# Patient Record
Sex: MALE | Race: WHITE | HISPANIC OR LATINO | Employment: PART TIME | URBAN - METROPOLITAN AREA
[De-identification: names, ages, dates, MRNs, and addresses within clinical notes are randomized per-mention and may not be internally consistent; named-entity substitution may affect disease eponyms.]

---

## 2018-05-01 LAB
ALBUMIN SERPL BCP-MCNC: 4.2 G/DL (ref 3.5–5.7)
ALP SERPL-CCNC: 71 IU/L (ref 60–400)
ALT SERPL W P-5'-P-CCNC: 17 IU/L (ref 0–50)
ANION GAP SERPL CALCULATED.3IONS-SCNC: 9 MM/L
APTT PPP: 28.9 SEC (ref 24.4–37.6)
AST SERPL W P-5'-P-CCNC: 22 U/L (ref 8–27)
BASOPHILS # BLD AUTO: 0 X3/UL (ref 0–0.3)
BASOPHILS # BLD AUTO: 0.5 % (ref 0–2)
BILIRUB SERPL-MCNC: 0.4 MG/DL (ref 0.3–1)
BUN SERPL-MCNC: 13 MG/DL (ref 7–25)
CALCIUM SERPL-MCNC: 9.1 MG/DL (ref 8.6–10.5)
CHLORIDE SERPL-SCNC: 106 MM/L (ref 98–107)
CO2 SERPL-SCNC: 26 MM/L (ref 21–31)
CREAT SERPL-MCNC: 1.01 MG/DL (ref 0.7–1.3)
DEPRECATED RDW RBC AUTO: 14 % (ref 11.5–14.5)
EGFR (HISTORICAL): > 60 GFR
EGFR AFRICAN AMERICAN (HISTORICAL): > 60 GFR
EOSINOPHIL # BLD AUTO: 0.1 X3/UL (ref 0–0.5)
EOSINOPHIL NFR BLD AUTO: 1.4 % (ref 0–5)
GLUCOSE (HISTORICAL): 88 MG/DL (ref 65–99)
HCT VFR BLD AUTO: 39.2 % (ref 42–52)
HGB BLD-MCNC: 13.2 G/DL (ref 14–18)
INR PPP: 1.02 (ref 0.9–1.5)
LDL CHOLESTEROL DIRECT (HISTORICAL): 69.6 MG/DL
LDL CHOLESTEROL DIRECT (HISTORICAL): 69.6 MG/DL
LYMPHOCYTES # BLD AUTO: 2.3 X3/UL (ref 1.2–4.2)
LYMPHOCYTES NFR BLD AUTO: 34.9 % (ref 20.5–51.1)
MCH RBC QN AUTO: 29 PG (ref 26–34)
MCHC RBC AUTO-ENTMCNC: 33.7 G/DL (ref 31–36)
MCV RBC AUTO: 86 FL (ref 81–99)
MONOCYTES # BLD AUTO: 0.8 X3/UL (ref 0–1)
MONOCYTES NFR BLD AUTO: 12.2 % (ref 1.7–12)
NEUTROPHILS # BLD AUTO: 3.4 X3/UL (ref 1.4–6.5)
NEUTS SEG NFR BLD AUTO: 51 % (ref 42.2–75.2)
OSMOLALITY, SERUM (HISTORICAL): 273 MOSM (ref 262–291)
PLATELET # BLD AUTO: 228 X3/UL (ref 130–400)
PMV BLD AUTO: 8.8 FL (ref 8.6–11.7)
POTASSIUM SERPL-SCNC: 4 MM/L (ref 3.5–5.5)
PROTHROMBIN TIME (HISTORICAL): 11.8 SEC (ref 10.1–12.9)
RBC # BLD AUTO: 4.55 X6/UL (ref 4.3–5.9)
SODIUM SERPL-SCNC: 137 MM/L (ref 134–143)
TOTAL PROTEIN (HISTORICAL): 6.5 G/DL (ref 6.4–8.9)
TROPONIN I SERPL-MCNC: < 0.03 NG/ML
WBC # BLD AUTO: 6.7 X3/UL (ref 4.8–10.8)

## 2018-05-22 LAB
ALBUMIN SERPL BCP-MCNC: 4.7 G/DL (ref 3.5–5.7)
ALP SERPL-CCNC: 64 IU/L (ref 60–400)
ALT SERPL W P-5'-P-CCNC: 9 IU/L (ref 0–50)
AMYLASE (HISTORICAL): 26 U/L (ref 29–103)
ANION GAP SERPL CALCULATED.3IONS-SCNC: 13.1 MM/L
APTT PPP: 31 SEC (ref 24.4–37.6)
AST SERPL W P-5'-P-CCNC: 16 U/L (ref 8–27)
BASOPHILS # BLD AUTO: 0 X3/UL (ref 0–0.3)
BASOPHILS # BLD AUTO: 0.7 % (ref 0–2)
BILIRUB SERPL-MCNC: 0.6 MG/DL (ref 0.3–1)
BILIRUB UR QL STRIP: NEGATIVE
BUN SERPL-MCNC: 8 MG/DL (ref 7–25)
CALCIUM SERPL-MCNC: 9.6 MG/DL (ref 8.6–10.5)
CHLORIDE SERPL-SCNC: 103 MM/L (ref 98–107)
CLARITY UR: CLEAR
CO2 SERPL-SCNC: 29 MM/L (ref 21–31)
COLOR UR: YELLOW
CREAT SERPL-MCNC: 1.09 MG/DL (ref 0.7–1.3)
DEPRECATED RDW RBC AUTO: 13.4 % (ref 11.5–14.5)
EGFR (HISTORICAL): > 60 GFR
EGFR AFRICAN AMERICAN (HISTORICAL): > 60 GFR
EOSINOPHIL # BLD AUTO: 0.1 X3/UL (ref 0–0.5)
EOSINOPHIL NFR BLD AUTO: 1.8 % (ref 0–5)
GLUCOSE (HISTORICAL): 80 MG/DL (ref 65–99)
GLUCOSE UR STRIP-MCNC: NEGATIVE MG/DL
HCT VFR BLD AUTO: 43.9 % (ref 42–52)
HGB BLD-MCNC: 14.5 G/DL (ref 14–18)
HGB UR QL STRIP.AUTO: NEGATIVE
INR PPP: 1.1 (ref 0.9–1.5)
KETONES UR STRIP-MCNC: NEGATIVE MG/DL
LEUKOCYTE ESTERASE UR QL STRIP: NEGATIVE
LIPASE SERPL-CCNC: 37 U/L (ref 11–82)
LYMPHOCYTES # BLD AUTO: 1.5 X3/UL (ref 1.2–4.2)
LYMPHOCYTES NFR BLD AUTO: 35.5 % (ref 20.5–51.1)
MCH RBC QN AUTO: 28.6 PG (ref 26–34)
MCHC RBC AUTO-ENTMCNC: 33 G/DL (ref 31–36)
MCV RBC AUTO: 86.6 FL (ref 81–99)
MONOCYTES # BLD AUTO: 0.4 X3/UL (ref 0–1)
MONOCYTES NFR BLD AUTO: 8.8 % (ref 1.7–12)
NEUTROPHILS # BLD AUTO: 2.3 X3/UL (ref 1.4–6.5)
NEUTS SEG NFR BLD AUTO: 53.2 % (ref 42.2–75.2)
NITRITE UR QL STRIP: NEGATIVE
OSMOLALITY, SERUM (HISTORICAL): 279 MOSM (ref 262–291)
PH UR STRIP.AUTO: 7 [PH] (ref 4.5–8)
PLATELET # BLD AUTO: 214 X3/UL (ref 130–400)
PMV BLD AUTO: 8.5 FL (ref 8.6–11.7)
POTASSIUM SERPL-SCNC: 4.1 MM/L (ref 3.5–5.5)
PROT UR STRIP-MCNC: NEGATIVE MG/DL
PROTHROMBIN TIME (HISTORICAL): 12.8 SEC (ref 10.1–12.9)
RBC # BLD AUTO: 5.07 X6/UL (ref 4.3–5.9)
SODIUM SERPL-SCNC: 141 MM/L (ref 134–143)
SP GR UR STRIP.AUTO: 1.02 (ref 1–1.03)
TOTAL PROTEIN (HISTORICAL): 6.9 G/DL (ref 6.4–8.9)
UROBILINOGEN UR QL STRIP.AUTO: 4 EU/DL (ref 0.2–8)
WBC # BLD AUTO: 4.3 X3/UL (ref 4.8–10.8)

## 2018-09-22 ENCOUNTER — OFFICE VISIT (OUTPATIENT)
Dept: URGENT CARE | Facility: CLINIC | Age: 23
End: 2018-09-22
Payer: COMMERCIAL

## 2018-09-22 VITALS
HEART RATE: 64 BPM | SYSTOLIC BLOOD PRESSURE: 122 MMHG | DIASTOLIC BLOOD PRESSURE: 68 MMHG | OXYGEN SATURATION: 98 % | TEMPERATURE: 98.1 F | RESPIRATION RATE: 18 BRPM

## 2018-09-22 DIAGNOSIS — K52.9 AGE (ACUTE GASTROENTERITIS): Primary | ICD-10-CM

## 2018-09-22 PROCEDURE — 99283 EMERGENCY DEPT VISIT LOW MDM: CPT | Performed by: PHYSICIAN ASSISTANT

## 2018-09-22 PROCEDURE — G0382 LEV 3 HOSP TYPE B ED VISIT: HCPCS | Performed by: PHYSICIAN ASSISTANT

## 2018-09-22 RX ORDER — DEXTROAMPHETAMINE SACCHARATE, AMPHETAMINE ASPARTATE, DEXTROAMPHETAMINE SULFATE AND AMPHETAMINE SULFATE 2.5; 2.5; 2.5; 2.5 MG/1; MG/1; MG/1; MG/1
10 TABLET ORAL
COMMUNITY

## 2018-09-22 NOTE — PROGRESS NOTES
[employer] [email or web address] [facility]    NAME: Vidhya Parrish is a 23 y o  male  : 1995    MRN: [id number]  DATE: 2018  TIME: 10:31 AM    Assessment and Plan   AGE (acute gastroenteritis) [K52 9]  1  AGE (acute gastroenteritis)         Patient Instructions     Patient Instructions   Stay hydrated by taking small sips of water through out the day  Avoid large amounts of water at one time  Advance diet as tolerated starting with crackers, toast   Can use imodium for diarrhea  If you are unable to hold down fluids and feel dehydrated, go to the emergency room  Can take tylenol or ibuprofen as needed for headache, pain, fever  Probiotics which can be found over the counter in pill form or in yogurt, such as activia, would be beneficial to increase normal gut jasper and help with diarrhea  If symptoms worsen go to the emergency room  Chief Complaint     Chief Complaint   Patient presents with    Vomiting     Pt c/o vomiting and diarrhea for three days  History of Present Illness   23year old male here with complaint of vomiting, nausea and diarrhea for the last 2-3 days  Has been able to hold down fluids  Needs a note for work  No fever chills  No abdominal pain  Review of Systems   Review of Systems   Constitutional: Negative for activity change, appetite change, chills, diaphoresis, fatigue, fever and unexpected weight change  HENT: Negative for congestion, ear pain, hearing loss, sinus pressure, sneezing, sore throat and tinnitus  Eyes: Negative for photophobia, redness and visual disturbance  Respiratory: Negative for apnea, cough, chest tightness, shortness of breath, wheezing and stridor  Cardiovascular: Negative for chest pain, palpitations and leg swelling  Gastrointestinal: Positive for diarrhea, nausea and vomiting  Negative for abdominal distention, abdominal pain, blood in stool and constipation     Endocrine: Negative for cold intolerance, heat intolerance, polydipsia, polyphagia and polyuria  Genitourinary: Negative for difficulty urinating, dysuria, flank pain, hematuria and urgency  Musculoskeletal: Negative for arthralgias, back pain, gait problem, myalgias, neck pain and neck stiffness  Skin: Negative for rash and wound  Neurological: Negative for dizziness, tremors, seizures, syncope, weakness, light-headedness, numbness and headaches  Hematological: Negative for adenopathy  Does not bruise/bleed easily  Psychiatric/Behavioral: Negative for agitation, behavioral problems, confusion and decreased concentration  The patient is not nervous/anxious  All other systems reviewed and are negative  Current Medications     Current Outpatient Prescriptions:     amphetamine-dextroamphetamine (ADDERALL) 10 mg tablet, Take 10 mg by mouth 2 (two) times a day, Disp: , Rfl:     Current Allergies     Allergies as of 09/22/2018    (No Known Allergies)          The following portions of the patient's history were reviewed and updated as appropriate: allergies, current medications, past family history, past medical history, past social history, past surgical history and problem list    No past medical history on file  No past surgical history on file  No family history on file  Social History     Social History    Marital status: Single     Spouse name: N/A    Number of children: N/A    Years of education: N/A     Occupational History    Not on file  Social History Main Topics    Smoking status: Not on file    Smokeless tobacco: Not on file    Alcohol use Not on file    Drug use: Unknown    Sexual activity: Not on file     Other Topics Concern    Not on file     Social History Narrative    No narrative on file     Medications have been verified  Objective   /68   Pulse 64   Temp 98 1 °F (36 7 °C)   Resp 18   SpO2 98%      Physical Exam   Physical Exam   Constitutional: He appears well-developed and well-nourished   No distress  HENT:   Head: Normocephalic  Right Ear: External ear normal    Left Ear: External ear normal    Nose: Nose normal    Mouth/Throat: Oropharynx is clear and moist  No oropharyngeal exudate  Neck: Normal range of motion  Neck supple  Cardiovascular: Normal rate, regular rhythm and normal heart sounds  No murmur heard  Pulmonary/Chest: Effort normal and breath sounds normal  No respiratory distress  He has no wheezes  He has no rales  Abdominal: Soft  Bowel sounds are normal  There is no tenderness  Musculoskeletal: Normal range of motion  Lymphadenopathy:     He has no cervical adenopathy  Skin: Skin is warm  No rash noted  Vitals reviewed

## 2018-09-22 NOTE — PATIENT INSTRUCTIONS
Stay hydrated by taking small sips of water through out the day  Avoid large amounts of water at one time  Advance diet as tolerated starting with crackers, toast   Can use imodium for diarrhea  If you are unable to hold down fluids and feel dehydrated, go to the emergency room  Can take tylenol or ibuprofen as needed for headache, pain, fever  Probiotics which can be found over the counter in pill form or in yogurt, such as activia, would be beneficial to increase normal gut jasper and help with diarrhea  If symptoms worsen go to the emergency room

## 2019-02-01 ENCOUNTER — OFFICE VISIT (OUTPATIENT)
Dept: URGENT CARE | Facility: CLINIC | Age: 24
End: 2019-02-01
Payer: COMMERCIAL

## 2019-02-01 VITALS
TEMPERATURE: 97.4 F | DIASTOLIC BLOOD PRESSURE: 59 MMHG | HEART RATE: 65 BPM | RESPIRATION RATE: 18 BRPM | OXYGEN SATURATION: 98 % | SYSTOLIC BLOOD PRESSURE: 118 MMHG

## 2019-02-01 DIAGNOSIS — J06.9 ACUTE URI: Primary | ICD-10-CM

## 2019-02-01 PROCEDURE — 99283 EMERGENCY DEPT VISIT LOW MDM: CPT | Performed by: PHYSICIAN ASSISTANT

## 2019-02-01 PROCEDURE — G0382 LEV 3 HOSP TYPE B ED VISIT: HCPCS | Performed by: PHYSICIAN ASSISTANT

## 2019-02-01 PROCEDURE — 99213 OFFICE O/P EST LOW 20 MIN: CPT | Performed by: PHYSICIAN ASSISTANT

## 2019-02-01 RX ORDER — BENZONATATE 100 MG/1
100 CAPSULE ORAL 3 TIMES DAILY PRN
Qty: 30 CAPSULE | Refills: 0 | Status: SHIPPED | OUTPATIENT
Start: 2019-02-01

## 2019-02-01 NOTE — PATIENT INSTRUCTIONS

## 2019-02-01 NOTE — PROGRESS NOTES
[employer] [facility]        NAME: Sivakumar Green is a 26 y o  male  : 1995    MRN: [id number]  DATE: 2019  TIME: 10:19 AM    Assessment and Plan   Acute URI [J06 9]  1  Acute URI  benzonatate (TESSALON PERLES) 100 mg capsule         Patient Instructions       Discussed with patient that symptoms are likely viral  Will rx tessalon perles for symptomatic relief of cough  Also recommend supportive measures at this time:   1  Push fluids and rest   2  OTC Tylenol/Motrin as needed for pain/fever   3  Flonase daily/Sudafed PRN for symptomatic  relief   4  If symptoms worsen or last longer than 1  week, return to clinic or call PCP        Patient Instructions     Upper Respiratory Infection   WHAT YOU NEED TO KNOW:   An upper respiratory infection is also called the common cold  It is an infection that can affect your nose, throat, ears, and sinuses  For healthy people, the common cold is usually not serious and does not need special treatment  Cold symptoms are usually worst for the first 3 to 5 days  Most people get better in 7 to 14 days  You may continue to cough for 2 to 3 weeks  Colds are caused by viruses and do not get better with antibiotics  DISCHARGE INSTRUCTIONS:   Seek care immediately if:   · You have chest pain or trouble breathing  Contact your healthcare provider if:   · You have a fever over 102ºF (39°C)  · Your sore throat gets worse or you see white or yellow spots in your throat  · Your symptoms get worse after 3 to 5 days or your cold is not better in 14 days  · You have a rash anywhere on your skin  · You have large, tender lumps in your neck  · You have thick, green, or yellow drainage from your nose  · You cough up thick yellow, green, or bloody mucus  · You are vomiting for more than 24 hours and cannot keep fluids down  · You have a bad earache  · You have questions or concerns about your condition or care  Medicines:   You may need any of the following:  · Decongestants  help reduce nasal congestion and help you breathe more easily  If you take decongestant pills, they may make you feel restless or cause problems with your sleep  Do not use decongestant sprays for more than a few days  · Cough suppressants  help reduce coughing  Ask your healthcare provider which type of cough medicine is best for you  · NSAIDs , such as ibuprofen, help decrease swelling, pain, and fever  NSAIDs can cause stomach bleeding or kidney problems in certain people  If you take blood thinner medicine, always ask your healthcare provider if NSAIDs are safe for you  Always read the medicine label and follow directions  · Acetaminophen  decreases pain and fever  It is available without a doctor's order  Ask how much to take and how often to take it  Follow directions  Read the labels of all other medicines you are using to see if they also contain acetaminophen, or ask your doctor or pharmacist  Acetaminophen can cause liver damage if not taken correctly  Do not use more than 4 grams (4,000 milligrams) total of acetaminophen in one day  · Take your medicine as directed  Contact your healthcare provider if you think your medicine is not helping or if you have side effects  Tell him or her if you are allergic to any medicine  Keep a list of the medicines, vitamins, and herbs you take  Include the amounts, and when and why you take them  Bring the list or the pill bottles to follow-up visits  Carry your medicine list with you in case of an emergency  Follow up with your healthcare provider as directed:  Write down your questions so you remember to ask them during your visits  Self-care:   · Rest as much as possible  Slowly start to do more each day  · Drink more liquids as directed  Liquids will help thin and loosen mucus so you can cough it up  Liquids will also help prevent dehydration   Liquids that help prevent dehydration include water, fruit juice, and broth  Do not drink liquids that contain caffeine  Caffeine can increase your risk for dehydration  Ask your healthcare provider how much liquid to drink each day  · Soothe a sore throat  Gargle with warm salt water  This helps your sore throat feel better  Make salt water by dissolving ¼ teaspoon salt in 1 cup warm water  You may also suck on hard candy or throat lozenges  You may use a sore throat spray  · Use a humidifier or vaporizer  Use a cool mist humidifier or a vaporizer to increase air moisture in your home  This may make it easier for you to breathe and help decrease your cough  · Use saline nasal drops as directed  These help relieve congestion  · Apply petroleum-based jelly around the outside of your nostrils  This can decrease irritation from blowing your nose  · Do not smoke  Nicotine and other chemicals in cigarettes and cigars can make your symptoms worse  They can also cause infections such as bronchitis or pneumonia  Ask your healthcare provider for information if you currently smoke and need help to quit  E-cigarettes or smokeless tobacco still contain nicotine  Talk to your healthcare provider before you use these products  Prevent spreading your cold to others:   · Try to stay away from other people during the first 2 to 3 days of your cold when it is more easily spread  · Do not share food or drinks  · Do not share hand towels with household members  · Wash your hands often, especially after you blow your nose  Turn away from other people and cover your mouth and nose with a tissue when you sneeze or cough  © 2017 [employer] Information is for End User's use only and may not be sold, redistributed or otherwise used for commercial purposes  All illustrations and images included in CareNotes® are the copyrighted property of [employer]  or [employer]eb [employer]  The above information is an  only   It is not intended as medical advice for individual conditions or treatments  Talk to your doctor, nurse or pharmacist before following any medical regimen to see if it is safe and effective for you  Proceed to  ER if symptoms worsen  Chief Complaint     Chief Complaint   Patient presents with    Vomiting     Pt c/o vomiting for four days and also a cough  History of Present Illness       Patient presents for eval of cough onset 3 days ago with associated post tussive emesis (x2 episodes daily), sinus congestion, runny nose  Pt states he has been taking aleve for pain but no other OTC medicines  Pt denies nausea, diarrhea, abd pain, fever/chills, cp, SOB  Review of Systems   Review of Systems      Current Medications       Current Outpatient Prescriptions:     amphetamine-dextroamphetamine (ADDERALL) 10 mg tablet, Take 10 mg by mouth 2 (two) times a day, Disp: , Rfl:     benzonatate (TESSALON PERLES) 100 mg capsule, Take 1 capsule (100 mg total) by mouth 3 (three) times a day as needed for cough, Disp: 30 capsule, Rfl: 0    Current Allergies     Allergies as of 02/01/2019    (No Known Allergies)            The following portions of the patient's history were reviewed and updated as appropriate: allergies, current medications, past family history, past medical history, past social history, past surgical history and problem list      No past medical history on file  No past surgical history on file  No family history on file  Medications have been verified  Objective   /59   Pulse 65   Temp (!) 97 4 °F (36 3 °C)   Resp 18   SpO2 98%        Physical Exam     Physical Exam   Constitutional: He is oriented to person, place, and time  He appears well-developed and well-nourished  No distress  HENT:   Head: Normocephalic and atraumatic  Right Ear: External ear and ear canal normal  Tympanic membrane is erythematous     Left Ear: Tympanic membrane, external ear and ear canal normal  Nose: Rhinorrhea present  Mouth/Throat: Uvula is midline, oropharynx is clear and moist and mucous membranes are normal    Eyes: Pupils are equal, round, and reactive to light  Conjunctivae and EOM are normal    Cardiovascular: Normal rate, regular rhythm and normal heart sounds  Exam reveals no gallop  No murmur heard  Pulmonary/Chest: No accessory muscle usage  No respiratory distress  He has no wheezes  He has no rhonchi  He has no rales  Abdominal: Soft  Normal appearance and bowel sounds are normal  There is no tenderness  There is no rebound and no guarding  Lymphadenopathy:     He has no cervical adenopathy  Neurological: He is alert and oriented to person, place, and time  No cranial nerve deficit  Skin: Skin is warm, dry and intact  No rash noted  Psychiatric: He has a normal mood and affect  Nursing note and vitals reviewed

## 2019-07-04 ENCOUNTER — HOSPITAL ENCOUNTER (EMERGENCY)
Facility: HOSPITAL | Age: 24
Discharge: HOME | End: 2019-07-04
Attending: FAMILY MEDICINE
Payer: OTHER MISCELLANEOUS

## 2019-07-04 VITALS
WEIGHT: 172 LBS | SYSTOLIC BLOOD PRESSURE: 126 MMHG | OXYGEN SATURATION: 97 % | HEART RATE: 67 BPM | BODY MASS INDEX: 22.07 KG/M2 | TEMPERATURE: 98.3 F | HEIGHT: 74 IN | RESPIRATION RATE: 16 BRPM | DIASTOLIC BLOOD PRESSURE: 73 MMHG

## 2019-07-04 DIAGNOSIS — S60.415A ABRASION OF LEFT RING FINGER, INITIAL ENCOUNTER: Primary | ICD-10-CM

## 2019-07-04 PROCEDURE — 90715 TDAP VACCINE 7 YRS/> IM: CPT | Performed by: FAMILY MEDICINE

## 2019-07-04 PROCEDURE — 90471 IMMUNIZATION ADMIN: CPT

## 2019-07-04 PROCEDURE — 99282 EMERGENCY DEPT VISIT SF MDM: CPT

## 2019-07-04 RX ORDER — GINSENG 100 MG
1 CAPSULE ORAL ONCE
Status: COMPLETED | OUTPATIENT
Start: 2019-07-04 | End: 2019-07-04

## 2019-07-04 RX ORDER — IBUPROFEN 400 MG/1
400 TABLET ORAL ONCE
Status: COMPLETED | OUTPATIENT
Start: 2019-07-04 | End: 2019-07-04

## 2019-07-04 RX ADMIN — IBUPROFEN 400 MG: 400 TABLET ORAL at 21:56

## 2019-07-04 RX ADMIN — BACITRACIN 1 SMALL APPLICATION: 500 OINTMENT TOPICAL at 21:52

## 2019-07-04 RX ADMIN — TETANUS TOXOID, REDUCED DIPHTHERIA TOXOID AND ACELLULAR PERTUSSIS VACCINE, ADSORBED 0.5 ML: 5; 2.5; 8; 8; 2.5 SUSPENSION INTRAMUSCULAR at 22:00

## 2019-07-05 NOTE — ED PROVIDER NOTES
History  Chief Complaint   Patient presents with    Nail Bed Injury     pt  was at work using a  when the blade made contact with left ring finger  minimal bleeding on arrival to ED  History provided by:  Patient   used: No      This is a 53-year-old male presented to ED with complain of laceration of the left ring finger when he was using a  at work  Pt states that this happened about 2 hours ago and told his boss who recommended the ED  The patient stated did not use anything on his nail  Patient is not up-to-date with his tetanus  Prior to Admission Medications   Prescriptions Last Dose Informant Patient Reported? Taking? amphetamine-dextroamphetamine (ADDERALL) 10 mg tablet Not Taking at Unknown time  Yes No   Sig: Take 10 mg by mouth 2 (two) times a day   benzonatate (TESSALON PERLES) 100 mg capsule Not Taking at Unknown time  No No   Sig: Take 1 capsule (100 mg total) by mouth 3 (three) times a day as needed for cough   Patient not taking: Reported on 7/4/2019      Facility-Administered Medications: None       History reviewed  No pertinent past medical history  History reviewed  No pertinent surgical history  History reviewed  No pertinent family history  I have reviewed and agree with the history as documented  Social History     Tobacco Use    Smoking status: Never Smoker    Smokeless tobacco: Never Used   Substance Use Topics    Alcohol use: Never     Frequency: Never    Drug use: Never        Review of Systems   Constitutional: Negative  Respiratory: Negative  Cardiovascular: Negative  Skin: Positive for wound  Physical Exam  Physical Exam   Constitutional: He is oriented to person, place, and time  He appears well-developed and well-nourished  HENT:   Head: Normocephalic and atraumatic  Cardiovascular: Normal rate, regular rhythm and normal heart sounds     Pulmonary/Chest: Effort normal and breath sounds normal  Neurological: He is alert and oriented to person, place, and time  Skin:   Left ring finger: There is superficial abrasion above the nail bed of the left ring finger  No laceration or nail bed injury noted  Nursing note and vitals reviewed  Vital Signs  ED Triage Vitals [07/04/19 2135]   Temperature Pulse Respirations Blood Pressure SpO2   98 3 °F (36 8 °C) 67 16 126/73 97 %      Temp Source Heart Rate Source Patient Position - Orthostatic VS BP Location FiO2 (%)   Temporal -- -- -- --      Pain Score       8           Vitals:    07/04/19 2135   BP: 126/73   Pulse: 67         Visual Acuity      ED Medications  Medications   tetanus-diphtheria-acellular pertussis (BOOSTRIX) IM injection 0 5 mL (0 5 mL Intramuscular Given 7/4/19 2200)   bacitracin topical ointment 1 small application (1 small application Topical Given 7/4/19 2152)   ibuprofen (MOTRIN) tablet 400 mg (400 mg Oral Given 7/4/19 2156)       Diagnostic Studies  Results Reviewed     None                 No orders to display              Procedures  Procedures       ED Course                               MDM    Disposition  Final diagnoses:   Abrasion of left ring finger, initial encounter     Time reflects when diagnosis was documented in both MDM as applicable and the Disposition within this note     Time User Action Codes Description Comment    7/4/2019  9:49 PM Ngoc, [address] Abrasion of left ring finger, initial encounter       ED Disposition     ED Disposition Condition Date/Time Comment    Discharge Stable u Jul 4, 2019 10:00 PM Ruben Banuelos discharge to home/self care              Follow-up Information     Follow up With Specialties Details Why Contact Shemar Pina DO Family Medicine Schedule an appointment as soon as possible for a visit in 2 days For wound re-check [address] [place] [address]  [phone]            Patient's Medications   Discharge Prescriptions    No medications on file     No discharge procedures on file      ED Provider  Electronically Signed by           Jose Sprague MD  07/04/19 9459

## 2019-10-12 ENCOUNTER — HOSPITAL ENCOUNTER (EMERGENCY)
Facility: HOSPITAL | Age: 24
Discharge: HOME | End: 2019-10-12
Attending: EMERGENCY MEDICINE | Admitting: EMERGENCY MEDICINE

## 2019-10-12 VITALS
SYSTOLIC BLOOD PRESSURE: 104 MMHG | OXYGEN SATURATION: 99 % | TEMPERATURE: 98 F | WEIGHT: 182 LBS | HEART RATE: 68 BPM | HEIGHT: 74 IN | BODY MASS INDEX: 23.36 KG/M2 | DIASTOLIC BLOOD PRESSURE: 62 MMHG | RESPIRATION RATE: 14 BRPM

## 2019-10-12 DIAGNOSIS — R11.2 NAUSEA AND VOMITING: ICD-10-CM

## 2019-10-12 DIAGNOSIS — F10.929 ALCOHOL INTOXICATION (HCC): Primary | ICD-10-CM

## 2019-10-12 LAB
ALBUMIN SERPL BCP-MCNC: 4.9 G/DL (ref 3.5–5.7)
ALP SERPL-CCNC: 58 U/L (ref 40–150)
ALT SERPL W P-5'-P-CCNC: 11 U/L (ref 7–52)
ANION GAP SERPL CALCULATED.3IONS-SCNC: 12 MMOL/L (ref 4–13)
AST SERPL W P-5'-P-CCNC: 21 U/L (ref 13–39)
ATRIAL RATE: 80 BPM
BASOPHILS # BLD AUTO: 0 THOUSANDS/ΜL (ref 0–0.1)
BASOPHILS NFR BLD AUTO: 1 % (ref 0–2)
BILIRUB SERPL-MCNC: 0.4 MG/DL (ref 0.2–1)
BUN SERPL-MCNC: 8 MG/DL (ref 7–25)
CALCIUM SERPL-MCNC: 9.5 MG/DL (ref 8.6–10.5)
CHLORIDE SERPL-SCNC: 107 MMOL/L (ref 98–107)
CO2 SERPL-SCNC: 23 MMOL/L (ref 21–31)
CREAT SERPL-MCNC: 1.02 MG/DL (ref 0.7–1.3)
EOSINOPHIL # BLD AUTO: 0 THOUSAND/ΜL (ref 0–0.61)
EOSINOPHIL NFR BLD AUTO: 0 % (ref 0–5)
ERYTHROCYTE [DISTWIDTH] IN BLOOD BY AUTOMATED COUNT: 13.2 % (ref 11.5–14.5)
ETHANOL SERPL-MCNC: 202.7 MG/DL
GFR SERPL CREATININE-BSD FRML MDRD: 105 ML/MIN/1.73SQ M
GLUCOSE SERPL-MCNC: 110 MG/DL (ref 65–99)
HCT VFR BLD AUTO: 45.2 % (ref 42–47)
HGB BLD-MCNC: 15.1 G/DL (ref 14–18)
LIPASE SERPL-CCNC: 21 U/L (ref 11–82)
LYMPHOCYTES # BLD AUTO: 1.4 THOUSANDS/ΜL (ref 0.6–4.47)
LYMPHOCYTES NFR BLD AUTO: 21 % (ref 21–51)
MCH RBC QN AUTO: 28.9 PG (ref 26–34)
MCHC RBC AUTO-ENTMCNC: 33.5 G/DL (ref 31–37)
MCV RBC AUTO: 86 FL (ref 81–99)
MONOCYTES # BLD AUTO: 0.4 THOUSAND/ΜL (ref 0.17–1.22)
MONOCYTES NFR BLD AUTO: 7 % (ref 2–12)
NEUTROPHILS # BLD AUTO: 4.7 THOUSANDS/ΜL (ref 1.4–6.5)
NEUTS SEG NFR BLD AUTO: 72 % (ref 42–75)
P AXIS: 76 DEGREES
PLATELET # BLD AUTO: 217 THOUSANDS/UL (ref 149–390)
PMV BLD AUTO: 8.6 FL (ref 8.6–11.7)
POTASSIUM SERPL-SCNC: 3.5 MMOL/L (ref 3.5–5.5)
PR INTERVAL: 168 MS
PROT SERPL-MCNC: 7.7 G/DL (ref 6.4–8.9)
QRS AXIS: 93 DEGREES
QRSD INTERVAL: 106 MS
QT INTERVAL: 388 MS
QTC INTERVAL: 447 MS
RBC # BLD AUTO: 5.24 MILLION/UL (ref 4.3–5.9)
SODIUM SERPL-SCNC: 142 MMOL/L (ref 134–143)
T WAVE AXIS: 67 DEGREES
VENTRICULAR RATE: 80 BPM
WBC # BLD AUTO: 6.5 THOUSAND/UL (ref 4.8–10.8)

## 2019-10-12 PROCEDURE — 83690 ASSAY OF LIPASE: CPT | Performed by: EMERGENCY MEDICINE

## 2019-10-12 PROCEDURE — 85025 COMPLETE CBC W/AUTO DIFF WBC: CPT | Performed by: EMERGENCY MEDICINE

## 2019-10-12 PROCEDURE — 96361 HYDRATE IV INFUSION ADD-ON: CPT

## 2019-10-12 PROCEDURE — 93010 ELECTROCARDIOGRAM REPORT: CPT | Performed by: INTERNAL MEDICINE

## 2019-10-12 PROCEDURE — 99284 EMERGENCY DEPT VISIT MOD MDM: CPT

## 2019-10-12 PROCEDURE — 80053 COMPREHEN METABOLIC PANEL: CPT | Performed by: EMERGENCY MEDICINE

## 2019-10-12 PROCEDURE — 93005 ELECTROCARDIOGRAM TRACING: CPT

## 2019-10-12 PROCEDURE — 80320 DRUG SCREEN QUANTALCOHOLS: CPT | Performed by: EMERGENCY MEDICINE

## 2019-10-12 PROCEDURE — 36415 COLL VENOUS BLD VENIPUNCTURE: CPT | Performed by: EMERGENCY MEDICINE

## 2019-10-12 PROCEDURE — 96374 THER/PROPH/DIAG INJ IV PUSH: CPT

## 2019-10-12 PROCEDURE — 96375 TX/PRO/DX INJ NEW DRUG ADDON: CPT

## 2019-10-12 RX ORDER — ONDANSETRON 2 MG/ML
4 INJECTION INTRAMUSCULAR; INTRAVENOUS ONCE
Status: COMPLETED | OUTPATIENT
Start: 2019-10-12 | End: 2019-10-12

## 2019-10-12 RX ORDER — METOCLOPRAMIDE HYDROCHLORIDE 5 MG/ML
10 INJECTION INTRAMUSCULAR; INTRAVENOUS ONCE
Status: COMPLETED | OUTPATIENT
Start: 2019-10-12 | End: 2019-10-12

## 2019-10-12 RX ADMIN — SODIUM CHLORIDE 1000 ML: 0.9 INJECTION, SOLUTION INTRAVENOUS at 04:35

## 2019-10-12 RX ADMIN — SODIUM CHLORIDE 1000 ML: 0.9 INJECTION, SOLUTION INTRAVENOUS at 04:12

## 2019-10-12 RX ADMIN — ONDANSETRON 4 MG: 2 INJECTION INTRAMUSCULAR; INTRAVENOUS at 04:11

## 2019-10-12 RX ADMIN — METOCLOPRAMIDE 10 MG: 5 INJECTION, SOLUTION INTRAMUSCULAR; INTRAVENOUS at 04:23

## 2019-10-12 NOTE — ED PROVIDER NOTES
History  Chief Complaint   Patient presents with    Alcohol Intoxication     friend reports he has "alcohol poisoning"   vomiting, speech slurred  Patient is a 26-year-old male who presents the emergency department complaining of nausea and vomiting and concern for possible alcohol poisoning he reports that he was drinking corona tonight but also is concerned that he recently received a flu shot about 4 days ago may be having an adverse reaction to that  Patient denies any other:  Ingestion sore medications or drugs abused or used tonight  Patient denies any fall or traumatic injury  History provided by:  Patient and friend  Alcohol Intoxication   Severity:  Moderate  Onset quality:  Sudden  Duration:  3 hours  Timing:  Constant  Progression:  Worsening  Chronicity:  New  Suspected agents:  Alcohol  Associated symptoms: confusion, nausea and vomiting    Associated symptoms: no abdominal pain, no headaches, no palpitations, no shortness of breath and no weakness        Prior to Admission Medications   Prescriptions Last Dose Informant Patient Reported? Taking? amphetamine-dextroamphetamine (ADDERALL) 10 mg tablet Not Taking at Unknown time  Yes No   Sig: Take 10 mg by mouth 2 (two) times a day   benzonatate (TESSALON PERLES) 100 mg capsule Not Taking at Unknown time  No No   Sig: Take 1 capsule (100 mg total) by mouth 3 (three) times a day as needed for cough   Patient not taking: Reported on 7/4/2019      Facility-Administered Medications: None       History reviewed  No pertinent past medical history  History reviewed  No pertinent surgical history  History reviewed  No pertinent family history  I have reviewed and agree with the history as documented      Social History     Tobacco Use    Smoking status: Never Smoker    Smokeless tobacco: Never Used   Substance Use Topics    Alcohol use: Yes     Frequency: Never    Drug use: Never        Review of Systems   Constitutional: Negative for activity change, appetite change, chills, fatigue and fever  HENT: Negative for congestion, ear pain, rhinorrhea and sore throat  Eyes: Negative for discharge, redness and visual disturbance  Respiratory: Negative for cough, chest tightness, shortness of breath and wheezing  Cardiovascular: Negative for chest pain and palpitations  Gastrointestinal: Positive for nausea and vomiting  Negative for abdominal pain, constipation and diarrhea  Endocrine: Negative for polydipsia and polyuria  Genitourinary: Negative for difficulty urinating, dysuria, frequency, hematuria and urgency  Musculoskeletal: Negative for arthralgias and myalgias  Skin: Negative for color change, pallor and rash  Neurological: Negative for dizziness, weakness, light-headedness, numbness and headaches  Hematological: Negative for adenopathy  Does not bruise/bleed easily  Psychiatric/Behavioral: Positive for confusion  All other systems reviewed and are negative  Physical Exam  Physical Exam   Constitutional: He is oriented to person, place, and time  He appears well-developed and well-nourished  Appears intoxicated  Somnolent   HENT:   Head: Normocephalic and atraumatic  Right Ear: External ear normal    Left Ear: External ear normal    Nose: Nose normal    Mouth/Throat: Oropharynx is clear and moist    Eyes: Pupils are equal, round, and reactive to light  Conjunctivae and EOM are normal    Neck: Normal range of motion  Neck supple  Cardiovascular: Normal rate, regular rhythm, normal heart sounds and intact distal pulses  Pulmonary/Chest: Effort normal and breath sounds normal  No respiratory distress  He has no wheezes  He has no rales  He exhibits no tenderness  Abdominal: Soft  Bowel sounds are normal  He exhibits no distension  There is no tenderness  There is no guarding  Musculoskeletal: Normal range of motion  Neurological: He is alert and oriented to person, place, and time   No cranial nerve deficit or sensory deficit  Patient is responsive to verbal stimulus and answers questions appropriately although somnolent and appears intoxicated   Skin: Skin is warm and dry  Psychiatric: He has a normal mood and affect  Nursing note and vitals reviewed        Vital Signs  ED Triage Vitals   Temperature Pulse Respirations Blood Pressure SpO2   10/12/19 0404 10/12/19 0402 10/12/19 0402 10/12/19 0402 10/12/19 0402   (!) 96 9 °F (36 1 °C) 82 16 101/58 99 %      Temp src Heart Rate Source Patient Position - Orthostatic VS BP Location FiO2 (%)   -- 10/12/19 0500 10/12/19 0500 10/12/19 0500 --    Monitor Lying Left arm       Pain Score       10/12/19 0402       No Pain           Vitals:    10/12/19 0402 10/12/19 0500   BP: 101/58 99/57   Pulse: 82 77   Patient Position - Orthostatic VS:  Lying         Visual Acuity      ED Medications  Medications   sodium chloride 0 9 % bolus 1,000 mL (0 mL Intravenous Stopped 10/12/19 0435)   ondansetron (ZOFRAN) injection 4 mg (4 mg Intravenous Given 10/12/19 0411)   metoclopramide (REGLAN) injection 10 mg (10 mg Intravenous Given 10/12/19 0423)   sodium chloride 0 9 % bolus 1,000 mL (0 mL Intravenous Stopped 10/12/19 0520)       Diagnostic Studies  Results Reviewed     Procedure Component Value Units Date/Time    Ethanol [[id number]]  (Abnormal) Collected:  10/12/19 0406    Lab Status:  Final result Specimen:  Blood from Arm, Right Updated:  10/12/19 0438     Ethanol Lvl 202 7 mg/dL     Lipase [[id number]]  (Normal) Collected:  10/12/19 0406    Lab Status:  Final result Specimen:  Blood from Arm, Right Updated:  10/12/19 0438     Lipase 21 u/L     Comprehensive metabolic panel [[id number]]  (Abnormal) Collected:  10/12/19 0406    Lab Status:  Final result Specimen:  Blood from Arm, Right Updated:  10/12/19 0438     Sodium 142 mmol/L      Potassium 3 5 mmol/L      Chloride 107 mmol/L      CO2 23 mmol/L      ANION GAP 12 mmol/L      BUN 8 mg/dL      Creatinine 1 02 mg/dL Glucose 110 mg/dL      Calcium 9 5 mg/dL      AST 21 U/L      ALT 11 U/L      Alkaline Phosphatase 58 U/L      Total Protein 7 7 g/dL      Albumin 4 9 g/dL      Total Bilirubin 0 40 mg/dL      eGFR 105 ml/min/1 73sq m     Narrative:       [place] guidelines for Chronic Kidney Disease (CKD):     Stage 1 with normal or high GFR (GFR > 90 mL/min/1 73 square meters)    Stage 2 Mild CKD (GFR = 60-89 mL/min/1 73 square meters)    Stage 3A Moderate CKD (GFR = 45-59 mL/min/1 73 square meters)    Stage 3B Moderate CKD (GFR = 30-44 mL/min/1 73 square meters)    Stage 4 Severe CKD (GFR = 15-29 mL/min/1 73 square meters)    Stage 5 End Stage CKD (GFR <15 mL/min/1 73 square meters)  Note: GFR calculation is accurate only with a steady state creatinine    CBC and differential [[id number]]  (Normal) Collected:  10/12/19 0406    Lab Status:  Final result Specimen:  Blood from Arm, Right Updated:  10/12/19 0419     WBC 6 50 Thousand/uL      RBC 5 24 Million/uL      Hemoglobin 15 1 g/dL      Hematocrit 45 2 %      MCV 86 fL      MCH 28 9 pg      MCHC 33 5 g/dL      RDW 13 2 %      MPV 8 6 fL      Platelets 610 Thousands/uL      Neutrophils Relative 72 %      Lymphocytes Relative 21 %      Monocytes Relative 7 %      Eosinophils Relative 0 %      Basophils Relative 1 %      Neutrophils Absolute 4 70 Thousands/µL      Lymphocytes Absolute 1 40 Thousands/µL      Monocytes Absolute 0 40 Thousand/µL      Eosinophils Absolute 0 00 Thousand/µL      Basophils Absolute 0 00 Thousands/µL     Rapid drug screen, urine [[id number]]     Lab Status:  No result Specimen:  Urine                  No orders to display              Procedures  ECG 12 Lead Documentation Only  Date/Time: 10/12/2019 4:18 AM  Performed by: Edilberto Mayberry DO  Authorized by:  Edilberto Mayberry DO     ECG reviewed by me, the ED Provider: yes    Patient location:  ED  Previous ECG:     Comparison to cardiac monitor: Yes    Quality:     Tracing quality:  Limited by artifact  Rate:     ECG rate:  80    ECG rate assessment: normal    Rhythm:     Rhythm: sinus rhythm    QRS:     QRS axis:  Right  ST segments:     ST segments:  Non-specific  T waves:     T waves: non-specific    Other findings:     Other findings: early repolarization             ED Course  ED Course as of Oct 12 0605   Sat Oct 12, 2019   0603 Patient is awake alert and ambulatory in the emergency department feeling improved now remains clinically stable  MDM  Number of Diagnoses or Management Options  Alcohol intoxication ([facility] [address] ): new and requires workup  Nausea and vomiting: new and requires workup  Diagnosis management comments: Patient remained clinically and hemodynamically stable in the emergency department his alcohol intoxication level improved and he was alert and oriented and ambulatory in the ED and wished to return home symptoms as nausea and vomiting had resolved and subsided the patient had a sober and responsible adult with him to transport him home and take responsibility of him for the day he is advised to avoid alcohol in excess in the future and follow up promptly with his primary physician for further evaluation and treatment return precautions and anticipatory guidance discussed           Amount and/or Complexity of Data Reviewed  Clinical lab tests: reviewed and ordered  Tests in the medicine section of CPT®: ordered and reviewed  Decide to obtain previous medical records or to obtain history from someone other than the patient: yes  Review and summarize past medical records: yes    Risk of Complications, Morbidity, and/or Mortality  Presenting problems: moderate  Diagnostic procedures: low  Management options: moderate    Patient Progress  Patient progress: stable      Disposition  Final diagnoses:   Alcohol intoxication ([facility] )   Nausea and vomiting     Time reflects when diagnosis was documented in both MDM as applicable and the Disposition within this note     Time User Action Codes Description Comment    10/12/2019  4:43 AM Clide Robert Add [F10 929] Alcohol intoxication ([address] )     10/12/2019  4:43 AM Clide Robert Add [R11 2] Nausea and vomiting       ED Disposition     ED Disposition Condition Date/Time Comment    Discharge Stable Sat Oct 12, 2019  4:43 AM Choco Cosby discharge to home/self care  Follow-up Information     Follow up With Specialties Details Why Contact Info    Dilip Kimbrough DO Family Medicine Schedule an appointment as soon as possible for a visit in 2 days  [address] [place] [address]  [phone]            Patient's Medications   Discharge Prescriptions    No medications on file     No discharge procedures on file      ED Provider  Electronically Signed by           Rachelle Ascencio DO  10/12/19 5979

## 2020-01-10 ENCOUNTER — OFFICE VISIT (OUTPATIENT)
Dept: URGENT CARE | Facility: CLINIC | Age: 25
End: 2020-01-10

## 2020-01-10 VITALS
DIASTOLIC BLOOD PRESSURE: 77 MMHG | WEIGHT: 182 LBS | BODY MASS INDEX: 23.36 KG/M2 | HEART RATE: 87 BPM | OXYGEN SATURATION: 98 % | SYSTOLIC BLOOD PRESSURE: 118 MMHG | TEMPERATURE: 98.7 F | HEIGHT: 74 IN | RESPIRATION RATE: 18 BRPM

## 2020-01-10 DIAGNOSIS — J20.8 ACUTE BACTERIAL BRONCHITIS: Primary | ICD-10-CM

## 2020-01-10 DIAGNOSIS — B96.89 ACUTE BACTERIAL BRONCHITIS: Primary | ICD-10-CM

## 2020-01-10 PROCEDURE — 99213 OFFICE O/P EST LOW 20 MIN: CPT | Performed by: PHYSICIAN ASSISTANT

## 2020-01-10 RX ORDER — AZITHROMYCIN 250 MG/1
TABLET, FILM COATED ORAL
Qty: 6 TABLET | Refills: 0 | Status: SHIPPED | OUTPATIENT
Start: 2020-01-10 | End: 2020-01-14

## 2020-01-10 NOTE — LETTER
January 10, 2020     Patient: Boni Michael   YOB: 1995   Date of Visit: 1/10/2020       To Whom It May Concern: It is my medical opinion that Helyn Sensor should not return to work until 1/11/19  If you have any questions or concerns, please don't hesitate to call  Sincerely,        Bobby Tan PA-C    CC: Kade Thomas

## 2020-01-10 NOTE — PROGRESS NOTES
[facility] Now    NAME: Nathaly Burleson is a 27 y o  male  : 1995    MRN: [id number]  DATE: January 10, 2020  TIME: 1:12 PM    Assessment and Plan   Acute bacterial bronchitis [J20 8, B96 89]  1  Acute bacterial bronchitis  azithromycin (ZITHROMAX) 250 mg tablet       Patient Instructions     Patient Instructions   I have prescribed an antibiotic for the infection  Please take the antibiotic as prescribed and finish the entire prescription  I recommend that the patient takes an over the counter probiotic or eats yogurt with live cultures in it [place]) to keep good bacteria in the gut and help prevent diarrhea  Wash hands frequently to prevent the spread of infection  Can use over the counter cough and cold medications to help with symptoms  Ibuprofen and/or tylenol as needed for pain or fever  If not improving over the next 7-10 days, follow up with PCP  Chief Complaint     Chief Complaint   Patient presents with    Cough     x 2 weeks    Generalized Body Aches    Fever       History of Present Illness   53-year-old male here with complaint of cough for the last 2 weeks  States it has been getting progressively worse  Cough is productive with thick sputum  Has felt feverish off and on  Complaining of body aches  Review of Systems   Review of Systems   Constitutional: Positive for chills and fever  Negative for fatigue  HENT: Positive for sore throat  Negative for congestion, ear pain, postnasal drip and sinus pressure  Respiratory: Positive for cough  Negative for shortness of breath and wheezing  Musculoskeletal: Positive for myalgias  Neurological: Negative for headaches  All other systems reviewed and are negative        Current Medications     Current Outpatient Medications:     amphetamine-dextroamphetamine (ADDERALL) 10 mg tablet, Take 10 mg by mouth 2 (two) times a day, Disp: , Rfl:     azithromycin (ZITHROMAX) 250 mg tablet, Take 2 tablets today then 1 tablet daily x 4 days, Disp: 6 tablet, Rfl: 0    benzonatate (TESSALON PERLES) 100 mg capsule, Take 1 capsule (100 mg total) by mouth 3 (three) times a day as needed for cough (Patient not taking: Reported on 7/4/2019), Disp: 30 capsule, Rfl: 0    Current Allergies     Allergies as of 01/10/2020    (No Known Allergies)          The following portions of the patient's history were reviewed and updated as appropriate: allergies, current medications, past family history, past medical history, past social history, past surgical history and problem list    History reviewed  No pertinent past medical history  History reviewed  No pertinent surgical history  History reviewed  No pertinent family history    Social History     Socioeconomic History    Marital status: /Civil Union     Spouse name: Not on file    Number of children: Not on file    Years of education: Not on file    Highest education level: Not on file   Occupational History    Not on file   Social Needs    Financial resource strain: Not on file    Food insecurity:     Worry: Not on file     Inability: Not on file    Transportation needs:     Medical: Not on file     Non-medical: Not on file   Tobacco Use    Smoking status: Never Smoker    Smokeless tobacco: Never Used   Substance and Sexual Activity    Alcohol use: Yes     Frequency: Never    Drug use: Never    Sexual activity: Not on file   Lifestyle    Physical activity:     Days per week: Not on file     Minutes per session: Not on file    Stress: Not on file   Relationships    Social connections:     Talks on phone: Not on file     Gets together: Not on file     Attends [employer] service: Not on file     Active member of club or organization: Not on file     Attends meetings of clubs or organizations: Not on file     Relationship status: Not on file    Intimate partner violence:     Fear of current or ex partner: Not on file     Emotionally abused: Not on file     Physically abused: Not on file     Forced sexual activity: Not on file   Other Topics Concern    Not on file   Social History Narrative    Not on file     Medications have been verified  Objective   /77   Pulse 87   Temp 98 7 °F (37 1 °C)   Resp 18   Ht 6' 2" (1 88 m)   Wt 82 6 kg (182 lb)   SpO2 98%   BMI 23 37 kg/m²      Physical Exam   Physical Exam   Constitutional: He appears well-developed and well-nourished  No distress  HENT:   Head: Normocephalic and atraumatic  Right Ear: Tympanic membrane normal    Left Ear: Tympanic membrane normal    Nose: Mucosal edema present  Mouth/Throat: Posterior oropharyngeal erythema present  Neck: Normal range of motion  Cardiovascular: Normal rate, regular rhythm and normal heart sounds  No murmur heard  Pulmonary/Chest: Effort normal and breath sounds normal  No respiratory distress  Nursing note and vitals reviewed

## 2020-02-25 ENCOUNTER — HOSPITAL ENCOUNTER (EMERGENCY)
Facility: HOSPITAL | Age: 25
Discharge: HOME | End: 2020-02-26
Attending: INTERNAL MEDICINE

## 2020-02-25 DIAGNOSIS — H60.91 RIGHT OTITIS EXTERNA: Primary | ICD-10-CM

## 2020-02-25 PROCEDURE — 69210 REMOVE IMPACTED EAR WAX UNI: CPT | Performed by: INTERNAL MEDICINE

## 2020-02-25 PROCEDURE — 99282 EMERGENCY DEPT VISIT SF MDM: CPT

## 2020-02-25 PROCEDURE — 99283 EMERGENCY DEPT VISIT LOW MDM: CPT | Performed by: INTERNAL MEDICINE

## 2020-02-26 VITALS
RESPIRATION RATE: 16 BRPM | OXYGEN SATURATION: 97 % | SYSTOLIC BLOOD PRESSURE: 115 MMHG | TEMPERATURE: 98.6 F | BODY MASS INDEX: 21.44 KG/M2 | HEART RATE: 60 BPM | WEIGHT: 167 LBS | DIASTOLIC BLOOD PRESSURE: 62 MMHG

## 2020-02-26 RX ORDER — NEOMYCIN SULFATE, POLYMYXIN B SULFATE AND HYDROCORTISONE 10; 3.5; 1 MG/ML; MG/ML; [USP'U]/ML
4 SUSPENSION/ DROPS AURICULAR (OTIC) 3 TIMES DAILY
Qty: 5 ML | Refills: 0 | Status: SHIPPED | OUTPATIENT
Start: 2020-02-26

## 2020-02-26 NOTE — ED PROVIDER NOTES
History  Chief Complaint   Patient presents with    Ear Problem     27year-old with right ear pain  Present for about 2 weeks  She has be getting worse  No fever no chills no sweats  No radiation of pain  No chronic illness  Prior to Admission Medications   Prescriptions Last Dose Informant Patient Reported? Taking? amphetamine-dextroamphetamine (ADDERALL) 10 mg tablet   Yes No   Sig: Take 10 mg by mouth 2 (two) times a day   benzonatate (TESSALON PERLES) 100 mg capsule   No No   Sig: Take 1 capsule (100 mg total) by mouth 3 (three) times a day as needed for cough   Patient not taking: Reported on 7/4/2019      Facility-Administered Medications: None       History reviewed  No pertinent past medical history  History reviewed  No pertinent surgical history  History reviewed  No pertinent family history  I have reviewed and agree with the history as documented  E-Cigarette/Vaping    E-Cigarette Use Never User      E-Cigarette/Vaping Substances     Social History     Tobacco Use    Smoking status: Never Smoker    Smokeless tobacco: Never Used   Substance Use Topics    Alcohol use: Never     Frequency: Never    Drug use: Never       Review of Systems   Constitutional: Negative for chills and fever  HENT: Positive for ear pain  Negative for rhinorrhea and sore throat  Eyes: Negative for visual disturbance  Respiratory: Negative for cough and shortness of breath  Cardiovascular: Negative for chest pain and leg swelling  Gastrointestinal: Negative for abdominal pain, diarrhea, nausea and vomiting  Genitourinary: Negative for dysuria  Musculoskeletal: Negative for back pain and myalgias  Skin: Negative for rash  Neurological: Negative for dizziness and headaches  Psychiatric/Behavioral: Negative for confusion  All other systems reviewed and are negative  Physical Exam  Physical Exam   Constitutional: He is oriented to person, place, and time   He appears well-developed and well-nourished  HENT:   Nose: Nose normal    Mouth/Throat: Oropharynx is clear and moist  No oropharyngeal exudate  Bilateral cerumen impaction   Eyes: Pupils are equal, round, and reactive to light  Conjunctivae and EOM are normal  No scleral icterus  Neck: Normal range of motion  Neck supple  No JVD present  No tracheal deviation present  Cardiovascular: Normal rate, regular rhythm and normal heart sounds  No murmur heard  Pulmonary/Chest: Effort normal and breath sounds normal  No respiratory distress  He has no wheezes  He has no rales  Abdominal: Soft  Bowel sounds are normal  There is no tenderness  There is no guarding  Musculoskeletal: Normal range of motion  He exhibits no edema or tenderness  Neurological: He is alert and oriented to person, place, and time  No cranial nerve deficit or sensory deficit  He exhibits normal muscle tone  5/5 motor, nl sens   Skin: Skin is warm and dry  Psychiatric: He has a normal mood and affect  His behavior is normal    Nursing note and vitals reviewed  Vital Signs  ED Triage Vitals [02/25/20 2308]   Temperature Pulse Respirations Blood Pressure SpO2   98 6 °F (37 °C) 70 16 117/64 98 %      Temp src Heart Rate Source Patient Position - Orthostatic VS BP Location FiO2 (%)   -- -- -- -- --      Pain Score       6           Vitals:    02/25/20 2308   BP: 117/64   Pulse: 70         Visual Acuity      ED Medications  Medications - No data to display    Diagnostic Studies  Results Reviewed     None                 No orders to display              Procedures  Procedures         ED Course  ED Course as of Feb 26 0044   Wed Feb 26, 2020   0026 The right ear was irrigated with jet wash using a 20 cc syringe with an Adaptic top  Significant amount of cerumen came out however there is still some left in the canal   He has some erythema of the external auditory canal which will treat with topical antibiotics    Lab follow-up with family doctor  He still has cerumen within the left ear which he will continue to use Cerumenex to try to soften that  MDM      Disposition  Final diagnoses:   None     ED Disposition     None      Follow-up Information    None         Patient's Medications   Discharge Prescriptions    No medications on file     No discharge procedures on file      PDMP Review     None          ED Provider  Electronically Signed by           Angela Vazquez DO  02/26/20 4572

## 2020-02-26 NOTE — DISCHARGE INSTRUCTIONS
Continue Cerumenex in the left ear  Use Cortisporin in the right ear 3 drops 3 times a day    Consider ENT evaluation if problems persist

## 2020-02-26 NOTE — ED NOTES
R ear irrigated with warm water and H2O2 several small pieces of wax removed     Tre Jj RN  99/26/55 4045

## 2020-07-16 ENCOUNTER — HOSPITAL ENCOUNTER (EMERGENCY)
Facility: HOSPITAL | Age: 25
Discharge: HOME | End: 2020-07-16
Attending: EMERGENCY MEDICINE | Admitting: EMERGENCY MEDICINE
Payer: COMMERCIAL

## 2020-07-16 VITALS
WEIGHT: 178 LBS | OXYGEN SATURATION: 97 % | BODY MASS INDEX: 22.84 KG/M2 | DIASTOLIC BLOOD PRESSURE: 77 MMHG | HEIGHT: 74 IN | HEART RATE: 72 BPM | TEMPERATURE: 98.2 F | RESPIRATION RATE: 18 BRPM | SYSTOLIC BLOOD PRESSURE: 123 MMHG

## 2020-07-16 DIAGNOSIS — J06.9 VIRAL URI WITH COUGH: Primary | ICD-10-CM

## 2020-07-16 DIAGNOSIS — Z20.822 PERSON UNDER INVESTIGATION FOR COVID-19: ICD-10-CM

## 2020-07-16 PROCEDURE — 99283 EMERGENCY DEPT VISIT LOW MDM: CPT

## 2020-07-16 PROCEDURE — 99284 EMERGENCY DEPT VISIT MOD MDM: CPT | Performed by: EMERGENCY MEDICINE

## 2020-07-16 PROCEDURE — U0003 INFECTIOUS AGENT DETECTION BY NUCLEIC ACID (DNA OR RNA); SEVERE ACUTE RESPIRATORY SYNDROME CORONAVIRUS 2 (SARS-COV-2) (CORONAVIRUS DISEASE [COVID-19]), AMPLIFIED PROBE TECHNIQUE, MAKING USE OF HIGH THROUGHPUT TECHNOLOGIES AS DESCRIBED BY CMS-2020-01-R: HCPCS | Performed by: EMERGENCY MEDICINE

## 2020-07-16 RX ORDER — ONDANSETRON 4 MG/1
4 TABLET, FILM COATED ORAL EVERY 6 HOURS
Qty: 12 TABLET | Refills: 0 | Status: SHIPPED | OUTPATIENT
Start: 2020-07-16

## 2020-07-16 RX ORDER — ONDANSETRON 4 MG/1
4 TABLET, ORALLY DISINTEGRATING ORAL ONCE
Status: COMPLETED | OUTPATIENT
Start: 2020-07-16 | End: 2020-07-16

## 2020-07-16 RX ADMIN — ONDANSETRON 4 MG: 4 TABLET, ORALLY DISINTEGRATING ORAL at 13:48

## 2020-07-16 NOTE — ED PROVIDER NOTES
History  Chief Complaint   Patient presents with    Sore Throat     lightheaded, shaky     Patient is a 62-year-old male with no past medical history presenting with 2 days of fatigue, sore throat, intermittent headache, nausea  Patient states over the last 2 days he has had with persistent fatigue, and generalized weakness associated with intermittent frontal headache, and chills  Patient had positive COVID exposure approximately 2 weeks ago, but has been feeling well since  This morning he felt nauseous, and had 1 episode of vomiting  Patient had picture of his phone which showed his vomit which was mucous with some stomach mucosa  He denies abdominal pain  He has also had watery brown diarrhea for last 1-2 days  He denies anosmia or dysgeusia  Discharged as a sore throat, dry cough, worse with swallowing, and approximately 2/10  He has been able to tolerate p o , and also added difficulty breathing, however has had mild, dry, nonproductive cough  Sore Throat   Location:  Generalized  Quality:  Burning  Severity:  Mild  Onset quality:  Gradual  Duration:  2 days  Timing:  Constant  Chronicity:  New  Relieved by:  None tried  Worsened by:  Swallowing  Ineffective treatments:  None tried  Associated symptoms: cough and headaches    Associated symptoms: no abdominal pain, no adenopathy, no ear discharge, no ear pain, no fever, no neck stiffness, no rash, no rhinorrhea, no shortness of breath and no trouble swallowing    Cough:     Cough characteristics:  Dry    Severity:  Mild    Onset quality:  Gradual    Duration:  2 days    Timing:  Constant    Chronicity:  New  Risk factors: sick contacts        Prior to Admission Medications   Prescriptions Last Dose Informant Patient Reported? Taking?    amphetamine-dextroamphetamine (ADDERALL) 10 mg tablet Not Taking at Unknown time  Yes No   Sig: Take 10 mg by mouth 2 (two) times a day   benzonatate (TESSALON PERLES) 100 mg capsule Not Taking at Unknown time  No No   Sig: Take 1 capsule (100 mg total) by mouth 3 (three) times a day as needed for cough   Patient not taking: Reported on 7/4/2019   neomycin-polymyxin-hydrocortisone (CORTISPORIN) 0 35%-10,000 units/mL-1% otic suspension Not Taking at Unknown time  No No   Sig: Administer 4 drops into ears 3 (three) times a day   Patient not taking: Reported on 7/16/2020      Facility-Administered Medications: None       Past Medical History:   Diagnosis Date    ADHD        History reviewed  No pertinent surgical history  History reviewed  No pertinent family history  I have reviewed and agree with the history as documented  E-Cigarette/Vaping    E-Cigarette Use Never User      E-Cigarette/Vaping Substances     Social History     Tobacco Use    Smoking status: Never Smoker    Smokeless tobacco: Never Used   Substance Use Topics    Alcohol use: Never     Frequency: Never    Drug use: Never       Review of Systems   Constitutional: Negative for fever  HENT: Positive for sore throat  Negative for ear discharge, ear pain, rhinorrhea and trouble swallowing  Respiratory: Positive for cough  Negative for shortness of breath  Gastrointestinal: Positive for nausea and vomiting  Negative for abdominal pain  Rectal pain: (x1, stomach contents)   Genitourinary: Negative for difficulty urinating and dysuria  Musculoskeletal: Negative for neck stiffness  Skin: Negative for pallor and rash  Neurological: Positive for headaches  Hematological: Negative for adenopathy  Physical Exam  Physical Exam   Constitutional: He is oriented to person, place, and time  He appears well-developed and well-nourished  Non-toxic appearance  He does not appear ill  HENT:   Head: Normocephalic and atraumatic  Mouth/Throat: Oropharynx is clear and moist and mucous membranes are normal  No trismus in the jaw  No uvula swelling   No oropharyngeal exudate, posterior oropharyngeal edema, posterior oropharyngeal erythema or tonsillar abscesses  Tonsils are 0 on the right  Tonsils are 0 on the left  No tonsillar exudate  Neck: Normal range of motion  Neck supple  Cardiovascular: Normal rate, regular rhythm, normal heart sounds and intact distal pulses  Exam reveals no friction rub  No murmur heard  Pulmonary/Chest: Effort normal and breath sounds normal  No stridor  He has no wheezes  He has no rales  Abdominal: Soft  Bowel sounds are normal  He exhibits no distension  There is no tenderness  Lymphadenopathy:     He has no cervical adenopathy  Neurological: He is alert and oriented to person, place, and time  Skin: Skin is warm and dry  Capillary refill takes less than 2 seconds  Psychiatric: He has a normal mood and affect  His behavior is normal        Vital Signs  ED Triage Vitals [07/16/20 1311]   Temperature Pulse Respirations Blood Pressure SpO2   98 1 °F (36 7 °C) 75 20 127/68 98 %      Temp Source Heart Rate Source Patient Position - Orthostatic VS BP Location FiO2 (%)   Oral -- Lying Left arm --      Pain Score       8           Vitals:    07/16/20 1311 07/16/20 1315   BP: 127/68    Pulse: 75 78   Patient Position - Orthostatic VS: Lying          Visual Acuity      ED Medications  Medications - No data to display    Diagnostic Studies  Results Reviewed     None                 No orders to display              Procedures  Procedures         ED Course       US AUDIT      Most Recent Value   Initial Alcohol Screen: US AUDIT-C    1  How often do you have a drink containing alcohol?  0 Filed at: 07/16/2020 1314   2  How many drinks containing alcohol do you have on a typical day you are drinking? 0 Filed at: 07/16/2020 1314   3a  Male UNDER 65: How often do you have five or more drinks on one occasion? 0 Filed at: 07/16/2020 1314   Audit-C Score  0 Filed at: 07/16/2020 1314                  SYDNEY/DAST-10      Most Recent Value   How many times in the past year have you       Used an illegal drug or used a prescription medication for non-medical reasons? Never Filed at: 07/16/2020 1314                                [facility]  Number of Diagnoses or Management Options  Person under investigation for COVID-19: minor  Viral URI with cough: new and does not require workup  Diagnosis management comments: Patient is a 51-year-old male, well-appearing, with signs and symptoms consistent with viral illness after potential corona virus exposure  No concern for significant bacterial infection as cause for his symptoms  No evidence to suggest bacterial pneumonia  +N/V/D, with only mild abdominal pain and nontender exam; doubt appendicitis given constellation of other symptoms  Denies any shortness of breath, exertional dyspnea, chest pain  He is mostly concerned about his next steps if he does have corona virus  I counseled patient on self isolation, and self isolation of his family  Will send outpatient COVID test  Supportive care  Patient instructed to follow up with PCP or return if he develops shortness of breath, chest pain, lightheadedness, difficulty tolerating PO, difficulty breathing, stridor, or any additional complaint  Amount and/or Complexity of Data Reviewed  Clinical lab tests: ordered  Tests in the medicine section of CPT®: ordered  Review and summarize past medical records: yes    Risk of Complications, Morbidity, and/or Mortality  Presenting problems: moderate  Diagnostic procedures: low  Management options: low          Disposition  Final diagnoses:   None     ED Disposition     None      Follow-up Information    None         Patient's Medications   Discharge Prescriptions    No medications on file     No discharge procedures on file      PDMP Review     None          ED Provider  Electronically Signed by           Darylene Hugger, DO  07/16/20 9539

## 2020-07-16 NOTE — DISCHARGE INSTRUCTIONS
You were seen here for multiple symptoms such as fatigue, headache, sore throat, vomiting, diarrhea, and chills  Your vital signs were normal and your physical exam was unremarkable  Because of your potential COVID exposure, it is possible this is the cause of your symptoms  We will send the COVID test which results in in several days  Until then, we recommend that you self-isolate and inform others that you are under investigation for COVID as they may need to follow up with their primary physician for a Coronavirus test     If your symptoms worsen, or if you develop new symptoms such as worsening/persistent pain,  lightheadedness, shortness of breath, inability to eat or drink, any difficulty breathing, or if you have any additional concerns, return to the emergency department immediately  Take Zofran for nausea as needed, and tylenol for aches/fever/chills (not to exceed 3000mg daily)

## 2020-07-21 LAB — SARS-COV-2 RNA SPEC QL NAA+PROBE: NORMAL

## 2021-05-30 ENCOUNTER — NURSE TRIAGE (OUTPATIENT)
Dept: OTHER | Facility: OTHER | Age: 26
End: 2021-05-30

## 2021-05-30 DIAGNOSIS — Z20.822 EXPOSURE TO COVID-19 VIRUS: Primary | ICD-10-CM

## 2021-05-30 DIAGNOSIS — Z20.822 EXPOSURE TO COVID-19 VIRUS: ICD-10-CM

## 2021-05-30 PROCEDURE — 87635 SARS-COV-2 COVID-19 AMP PRB: CPT | Performed by: FAMILY MEDICINE

## 2021-05-30 NOTE — TELEPHONE ENCOUNTER
Reason for Disposition   [1] COVID-19 infection suspected by caller or triager AND [2] mild symptoms (cough, fever, or others) AND [2] no complications or SOB    Answer Assessment - Initial Assessment Questions  Were you within 6 feet or less, for up to 15 minutes or more with a person that has a confirmed COVID-19 test?        Yes    What was the date of your exposure? Last week    Are you experiencing any symptoms attributed to the virus?  (Assess for SOB, cough, fever, difficulty breathing)        Yes  Upset stomach      HIGH RISK: Do you have any history heart or lung conditions, weakened immune system, diabetes, Asthma, CHF, HIV, COPD, Chemo, renal failure, sickle cell, etc?        Denies    Protocols used: CORONAVIRUS (COVID-19) [facility]

## 2021-05-30 NOTE — TELEPHONE ENCOUNTER
Pt is requesting a covid test and does not have a [facility] PCP  Reports having an out of network PCP  Order placed   Pt informed of closest testing site and was advised of hours of operation, address, to wear a mask, and to stay in the car       Link sent to pts email address to create a [employer] account to check for results  Pt does not have a PCP to follow up with but is also not interested in establishing care with a [facility] PCP at this time

## 2021-05-31 ENCOUNTER — TELEPHONE (OUTPATIENT)
Dept: OTHER | Facility: OTHER | Age: 26
End: 2021-05-31

## 2021-05-31 LAB — SARS-COV-2 RNA RESP QL NAA+PROBE: NEGATIVE

## 2021-05-31 NOTE — TELEPHONE ENCOUNTER
Your test for COVID-19, also known as novel coronavirus, came back negative  You do not have COVID-19  If you have any additional questions, we can schedule a virtual visit for you with a provider or [phone] option [phone] for care advice  For additional information , please visit the Coronavirus FAQ on the [address]  ([facility])

## 2021-08-12 ENCOUNTER — HOSPITAL ENCOUNTER (EMERGENCY)
Facility: HOSPITAL | Age: 26
Discharge: HOME | End: 2021-08-12
Attending: EMERGENCY MEDICINE | Admitting: EMERGENCY MEDICINE
Payer: COMMERCIAL

## 2021-08-12 ENCOUNTER — APPOINTMENT (EMERGENCY)
Dept: CT IMAGING | Facility: HOSPITAL | Age: 26
End: 2021-08-12
Payer: COMMERCIAL

## 2021-08-12 VITALS
HEIGHT: 74 IN | HEART RATE: 72 BPM | RESPIRATION RATE: 20 BRPM | WEIGHT: 187 LBS | SYSTOLIC BLOOD PRESSURE: 118 MMHG | BODY MASS INDEX: 24 KG/M2 | DIASTOLIC BLOOD PRESSURE: 72 MMHG | TEMPERATURE: 98.3 F

## 2021-08-12 DIAGNOSIS — T65.91XA ACCIDENTAL INGESTION OF TOXIC SUBSTANCE, INITIAL ENCOUNTER: Primary | ICD-10-CM

## 2021-08-12 LAB
ALBUMIN SERPL BCP-MCNC: 4.5 G/DL (ref 3.5–5.7)
ALP SERPL-CCNC: 66 U/L (ref 40–150)
ALT SERPL W P-5'-P-CCNC: 10 U/L (ref 7–52)
ANION GAP SERPL CALCULATED.3IONS-SCNC: 4 MMOL/L (ref 4–13)
AST SERPL W P-5'-P-CCNC: 16 U/L (ref 13–39)
BASOPHILS # BLD AUTO: 0 THOUSANDS/ΜL (ref 0–0.1)
BASOPHILS NFR BLD AUTO: 1 % (ref 0–2)
BILIRUB SERPL-MCNC: 0.4 MG/DL (ref 0.2–1)
BUN SERPL-MCNC: 18 MG/DL (ref 7–25)
CALCIUM SERPL-MCNC: 9.7 MG/DL (ref 8.6–10.5)
CHLORIDE SERPL-SCNC: 104 MMOL/L (ref 98–107)
CO2 SERPL-SCNC: 28 MMOL/L (ref 21–31)
CREAT SERPL-MCNC: 1.08 MG/DL (ref 0.7–1.3)
EOSINOPHIL # BLD AUTO: 0 THOUSAND/ΜL (ref 0–0.61)
EOSINOPHIL NFR BLD AUTO: 1 % (ref 0–5)
ERYTHROCYTE [DISTWIDTH] IN BLOOD BY AUTOMATED COUNT: 13.7 % (ref 11.5–14.5)
GFR SERPL CREATININE-BSD FRML MDRD: 96 ML/MIN/1.73SQ M
GLUCOSE SERPL-MCNC: 97 MG/DL (ref 65–99)
HCT VFR BLD AUTO: 43.4 % (ref 42–47)
HGB BLD-MCNC: 14.6 G/DL (ref 14–18)
LIPASE SERPL-CCNC: 27 U/L (ref 11–82)
LYMPHOCYTES # BLD AUTO: 1.5 THOUSANDS/ΜL (ref 0.6–4.47)
LYMPHOCYTES NFR BLD AUTO: 20 % (ref 21–51)
MCH RBC QN AUTO: 28.9 PG (ref 26–34)
MCHC RBC AUTO-ENTMCNC: 33.6 G/DL (ref 31–37)
MCV RBC AUTO: 86 FL (ref 81–99)
MONOCYTES # BLD AUTO: 0.5 THOUSAND/ΜL (ref 0.17–1.22)
MONOCYTES NFR BLD AUTO: 7 % (ref 2–12)
NEUTROPHILS # BLD AUTO: 5.7 THOUSANDS/ΜL (ref 1.4–6.5)
NEUTS SEG NFR BLD AUTO: 73 % (ref 42–75)
PLATELET # BLD AUTO: 245 THOUSANDS/UL (ref 149–390)
PMV BLD AUTO: 8.5 FL (ref 8.6–11.7)
POTASSIUM SERPL-SCNC: 4.1 MMOL/L (ref 3.5–5.5)
PROT SERPL-MCNC: 7.1 G/DL (ref 6.4–8.9)
RBC # BLD AUTO: 5.04 MILLION/UL (ref 4.3–5.9)
SODIUM SERPL-SCNC: 136 MMOL/L (ref 134–143)
WBC # BLD AUTO: 7.9 THOUSAND/UL (ref 4.8–10.8)

## 2021-08-12 PROCEDURE — 99447 NTRPROF PH1/NTRNET/EHR 11-20: CPT | Performed by: EMERGENCY MEDICINE

## 2021-08-12 PROCEDURE — G1004 CDSM NDSC: HCPCS

## 2021-08-12 PROCEDURE — 80053 COMPREHEN METABOLIC PANEL: CPT | Performed by: EMERGENCY MEDICINE

## 2021-08-12 PROCEDURE — 83690 ASSAY OF LIPASE: CPT | Performed by: EMERGENCY MEDICINE

## 2021-08-12 PROCEDURE — 36415 COLL VENOUS BLD VENIPUNCTURE: CPT | Performed by: EMERGENCY MEDICINE

## 2021-08-12 PROCEDURE — 74177 CT ABD & PELVIS W/CONTRAST: CPT

## 2021-08-12 PROCEDURE — 85025 COMPLETE CBC W/AUTO DIFF WBC: CPT | Performed by: EMERGENCY MEDICINE

## 2021-08-12 PROCEDURE — 99284 EMERGENCY DEPT VISIT MOD MDM: CPT

## 2021-08-12 PROCEDURE — 99285 EMERGENCY DEPT VISIT HI MDM: CPT | Performed by: EMERGENCY MEDICINE

## 2021-08-12 RX ADMIN — IOHEXOL 100 ML: 350 INJECTION, SOLUTION INTRAVENOUS at 11:52

## 2021-08-12 NOTE — DISCHARGE INSTRUCTIONS
Return to the ER with any worsening issues with bleeding  Please consider following up with Gastroenterology within the next week for repeat evaluation  Recommend water ingestion for the next 24 hours to flush her system  Never drink peroxide

## 2021-08-12 NOTE — ED PROVIDER NOTES
History  Chief Complaint   Patient presents with    Vomiting     vomiting after ingesting peroxide while treating dental pain     21-year-old male presents emergency department complaining of epigastric pain and right lower quadrant abdominal pain  Patient notes he has had dental pain as well, notably tooth number 12  He was watching a video on YouTube that was mixing peroxide with baking soda and he was supposed to gargle with a mixture of this but swallowed a moderate amount of it  This made him start vomiting but now is complaining of a lot of epigastric pain as well as vomiting blood  Patient notes he also slipped in the shower and fell, hitting his lumbar spine on this spicket  Prior to Admission Medications   Prescriptions Last Dose Informant Patient Reported? Taking? amphetamine-dextroamphetamine (ADDERALL) 10 mg tablet   Yes No   Sig: Take 10 mg by mouth 2 (two) times a day   benzonatate (TESSALON PERLES) 100 mg capsule   No No   Sig: Take 1 capsule (100 mg total) by mouth 3 (three) times a day as needed for cough   Patient not taking: Reported on 7/4/2019   neomycin-polymyxin-hydrocortisone (CORTISPORIN) 0 35%-10,000 units/mL-1% otic suspension   No No   Sig: Administer 4 drops into ears 3 (three) times a day   Patient not taking: Reported on 7/16/2020   ondansetron (ZOFRAN) 4 mg tablet   No No   Sig: Take 1 tablet (4 mg total) by mouth every 6 (six) hours      Facility-Administered Medications: None       Past Medical History:   Diagnosis Date    ADHD        History reviewed  No pertinent surgical history  History reviewed  No pertinent family history  I have reviewed and agree with the history as documented      E-Cigarette/Vaping    E-Cigarette Use Never User      E-Cigarette/Vaping Substances     Social History     Tobacco Use    Smoking status: Never Smoker    Smokeless tobacco: Never Used   Vaping Use    Vaping Use: Never used   Substance Use Topics    Alcohol use: Never    Drug use: Never       Review of Systems   Constitutional: Negative for chills and fever  HENT: Negative for ear pain and sore throat  Eyes: Negative for pain and visual disturbance  Respiratory: Negative for cough and shortness of breath  Cardiovascular: Negative for chest pain and palpitations  Gastrointestinal: Positive for abdominal pain, nausea and vomiting  Hematemesis  Genitourinary: Negative for dysuria and hematuria  Musculoskeletal: Positive for back pain  Negative for arthralgias  Skin: Negative for color change and rash  Neurological: Negative for seizures and syncope  All other systems reviewed and are negative  Physical Exam  Physical Exam  Constitutional:       General: He is not in acute distress  Appearance: Normal appearance  He is normal weight  He is not ill-appearing  HENT:      Head: Normocephalic and atraumatic  Right Ear: External ear normal       Left Ear: External ear normal       Nose: Nose normal       Mouth/Throat:      Mouth: Mucous membranes are moist       Comments: Patient has some areas of poor dentition  There is tenderness to tooth 13  With percussion however there is no evidence of gingival swelling or abscess  I cannot appreciate a dental caries on that tooth  There is no trismus  Tongue and uvula are midline  Eyes:      Conjunctiva/sclera: Conjunctivae normal    Cardiovascular:      Rate and Rhythm: Normal rate and regular rhythm  Pulses: Normal pulses  Heart sounds: Normal heart sounds  Pulmonary:      Effort: Pulmonary effort is normal       Breath sounds: Normal breath sounds  Abdominal:      General: Abdomen is flat  There is no distension  Palpations: Abdomen is soft  There is no mass  Tenderness: There is abdominal tenderness  There is no guarding or rebound  Comments: Patient complains of epigastric tenderness to palpation as well as right lower quadrant pain     Musculoskeletal: General: Tenderness present  No swelling or deformity  Normal range of motion  Cervical back: Normal range of motion  Comments: Patient has tenderness to L3 and to L5 in the midline and bilateral paraspinal musculature  There is no abrasion or ecchymosis  He notes the pain is just being "sore" without significant tenderness  Skin:     General: Skin is warm and dry  Capillary Refill: Capillary refill takes 2 to 3 seconds  Coloration: Skin is not pale  Neurological:      General: No focal deficit present  Mental Status: He is alert and oriented to person, place, and time  Mental status is at baseline  Motor: No weakness     Psychiatric:         Mood and Affect: Mood normal          Vital Signs  ED Triage Vitals   Temperature Pulse Respirations Blood Pressure SpO2   08/12/21 0924 08/12/21 0924 08/12/21 0924 08/12/21 0928 --   98 3 °F (36 8 °C) 66 18 119/73       Temp Source Heart Rate Source Patient Position - Orthostatic VS BP Location FiO2 (%)   08/12/21 0924 08/12/21 0924 -- 08/12/21 1347 --   Temporal Monitor  Left arm       Pain Score       08/12/21 0924       Worst Possible Pain           Vitals:    08/12/21 0924 08/12/21 0928 08/12/21 1347   BP:  119/73 118/72   Pulse: 66  72         Visual Acuity      ED Medications  Medications   iohexol (OMNIPAQUE) 350 MG/ML injection (SINGLE-DOSE) 100 mL (100 mL Intravenous Given 8/12/21 1152)       Diagnostic Studies  Results Reviewed     Procedure Component Value Units Date/Time    Comprehensive metabolic panel [[id number]] Collected: 08/12/21 1034    Lab Status: Final result Specimen: Blood from Arm, Right Updated: 08/12/21 1058     Sodium 136 mmol/L      Potassium 4 1 mmol/L      Chloride 104 mmol/L      CO2 28 mmol/L      ANION GAP 4 mmol/L      BUN 18 mg/dL      Creatinine 1 08 mg/dL      Glucose 97 mg/dL      Calcium 9 7 mg/dL      AST 16 U/L      ALT 10 U/L      Alkaline Phosphatase 66 U/L      Total Protein 7 1 g/dL      Albumin 4 5 g/dL      Total Bilirubin 0 40 mg/dL      eGFR 96 ml/min/1 73sq m     Narrative:      National Kidney Disease Foundation guidelines for Chronic Kidney Disease (CKD):     Stage 1 with normal or high GFR (GFR > 90 mL/min/1 73 square meters)    Stage 2 Mild CKD (GFR = 60-89 mL/min/1 73 square meters)    Stage 3A Moderate CKD (GFR = 45-59 mL/min/1 73 square meters)    Stage 3B Moderate CKD (GFR = 30-44 mL/min/1 73 square meters)    Stage 4 Severe CKD (GFR = 15-29 mL/min/1 73 square meters)    Stage 5 End Stage CKD (GFR <15 mL/min/1 73 square meters)  Note: GFR calculation is accurate only with a steady state creatinine    Lipase [[id number]]  (Normal) Collected: 08/12/21 1034    Lab Status: Final result Specimen: Blood from Arm, Right Updated: 08/12/21 1058     Lipase 27 u/L     CBC and differential [[id number]]  (Abnormal) Collected: 08/12/21 1034    Lab Status: Final result Specimen: Blood from Arm, Right Updated: 08/12/21 1039     WBC 7 90 Thousand/uL      RBC 5 04 Million/uL      Hemoglobin 14 6 g/dL      Hematocrit 43 4 %      MCV 86 fL      MCH 28 9 pg      MCHC 33 6 g/dL      RDW 13 7 %      MPV 8 5 fL      Platelets 851 Thousands/uL      Neutrophils Relative 73 %      Lymphocytes Relative 20 %      Monocytes Relative 7 %      Eosinophils Relative 1 %      Basophils Relative 1 %      Neutrophils Absolute 5 70 Thousands/µL      Lymphocytes Absolute 1 50 Thousands/µL      Monocytes Absolute 0 50 Thousand/µL      Eosinophils Absolute 0 00 Thousand/µL      Basophils Absolute 0 00 Thousands/µL                  CT abdomen pelvis with contrast   Final Result by Roman Wadsworth MD (08/12 1247)      No acute intra-abdominal pathology  Specifically, no evidence for acute appendicitis              Workstation performed: [id number]                    Procedures  Procedures         ED Course  ED Course as of Aug 12 1634   Thu Aug 12, 2021   1350 Discussed case with Toxicology who notes that from their standpoint, if it was a small amount and it is household peroxide and not industrial, that the patient can be discharged  They asked me to follow up with Gastroenterology to evaluate whether they would want to do an endoscopy due to bleeding  Discuss the case with Dr Vania Sánchez in GI who notes that if the patient is doing better and labs and CT are negative that he can follow-up in the office outpatient  Upon me asking the patient he notes he had 1 mouth full of peroxide/baking soda  He has had no further episodes of vomiting while in the emergency department  Patient's abdominal pain is also resolved  MDM    Disposition  Final diagnoses:   Accidental ingestion of toxic substance, initial encounter     Time reflects when diagnosis was documented in both MDM as applicable and the Disposition within this note     Time User Action Codes Description Comment    8/12/2021  1:27 PM [place], [address] Accidental ingestion of toxic substance, initial encounter       ED Disposition     ED Disposition Condition Date/Time Comment    Discharge Stable u Aug 12, 2021  1:44 PM Buddy Ellington discharge to home/self care              Follow-up Information     Follow up With Specialties Details Why [address] Ackerman MD Gastroenterology On 8/17/2021  [facility] [address]  [phone]            Discharge Medication List as of 8/12/2021  1:46 PM      CONTINUE these medications which have NOT CHANGED    Details   amphetamine-dextroamphetamine (ADDERALL) 10 mg tablet Take 10 mg by mouth 2 (two) times a day, Historical Med      benzonatate (TESSALON PERLES) 100 mg capsule Take 1 capsule (100 mg total) by mouth 3 (three) times a day as needed for cough, Starting Fri 2/1/2019, Normal      neomycin-polymyxin-hydrocortisone (CORTISPORIN) 0 35%-10,000 units/mL-1% otic suspension Administer 4 drops into ears 3 (three) times a day, Starting Wed 2/26/2020, Normal      ondansetron (ZOFRAN) 4 mg tablet Take 1 tablet (4 mg total) by mouth every 6 (six) hours, Starting Thu 7/16/2020, Print               PDMP Review     None          ED Provider  Electronically Signed by           Windy Schwarz DO  08/12/21 5630

## 2021-08-12 NOTE — CONSULTS
INTERPROFESSIONAL (PHONE) [facility]  Preet Cummings 23 y o  male MRN: [id number]  Unit/Bed#: Z2 H3 Encounter: [id number]      Reason for Consult / Principal Problem: accidental ingestion   Consults  08/12/21      ASSESSMENT:  24-year-old male with accidental ingestion of peroxide baking soda with subsequent abdominal pain, vomiting and transient blood tinged vomitus    RECOMMENDATIONS:  This patient has been observed for 6 hr at this point and is now feeling better, and the peroxide was low, household concentration, I would defer to Gastroenterology to determine if they recommend endoscopy to rule out caustic injury  For further questions, please contact the medical  on call via [employer] or throughl the [employer]  [employer] or Patient "[employer]  Please see additional teaching note below:    Hx and PE limited by the dynamics of a phone consultation  I have not personally interviewed or evaluated the patient, but only advised based on the information provided to me  Primary provider is responsible for all clinical decisions  Pertinent history, physical exam and clinical findings and course discussed: Preet Cummings is a 23y o  year old male who presents with accidental ingestion of peroxide and baking soda that he was switching in his mouth to try to treat a toothache  Review of systems and physical exam not performed by me  Historical Information   Past Medical History:   Diagnosis Date    ADHD      No past surgical history on file  Social History   Social History     Substance and Sexual Activity   Alcohol Use Never     Social History     Substance and Sexual Activity   Drug Use Never     Social History     Tobacco Use   Smoking Status Never Smoker   Smokeless Tobacco Never Used     No family history on file  Prior to Admission medications    Medication Sig Start Date End Date Taking?  Authorizing Provider   amphetamine-dextroamphetamine (ADDERALL) 10 mg tablet Take 10 mg by mouth 2 (two) times a day    Historical Provider, MD   benzonatate (TESSALON PERLES) 100 mg capsule Take 1 capsule (100 mg total) by mouth 3 (three) times a day as needed for cough  Patient not taking: Reported on 7/4/2019 2/1/19   Sharmila Richmond PA-C   neomycin-polymyxin-hydrocortisone (CORTISPORIN) 0 35%-10,000 units/mL-1% otic suspension Administer 4 drops into ears 3 (three) times a day  Patient not taking: Reported on 7/16/2020 2/26/20   Annie Rey DO   ondansetron [facility] 4 mg tablet Take 1 tablet (4 mg total) by mouth every 6 (six) hours 7/16/20   Sarina Francisco DO       No current facility-administered medications for this encounter  No Known Allergies    Objective     No intake or output data in the 24 hours ending 08/12/21 1324    Invasive Devices:        Vitals   Vitals:    08/12/21 0924 08/12/21 0928   BP:  119/73   TempSrc: Temporal    Pulse: 66    Resp: 18    Temp: 98 3 °F (36 8 °C)            Lab Results: I have personally reviewed pertinent reports  Labs:    Results from last 7 days   Lab Units 08/12/21  1034   WBC Thousand/uL 7 90   HEMOGLOBIN g/dL 14 6   HEMATOCRIT % 43 4   PLATELETS Thousands/uL 245   NEUTROS PCT % 73   LYMPHS PCT % 20*   MONOS PCT % 7      Results from last 7 days   Lab Units 08/12/21  1034   SODIUM mmol/L 136   POTASSIUM mmol/L 4 1   CHLORIDE mmol/L 104   CO2 mmol/L 28   BUN mg/dL 18   CREATININE mg/dL 1 08   CALCIUM mg/dL 9 7   ALK PHOS U/L 66   ALT U/L 10   AST U/L 16            Counseling / Coordination of Care  Total time spent today 22 minutes  This was a phone consultation

## 2022-02-23 ENCOUNTER — APPOINTMENT (EMERGENCY)
Dept: RADIOLOGY | Facility: HOSPITAL | Age: 27
End: 2022-02-23
Payer: OTHER GOVERNMENT

## 2022-02-23 ENCOUNTER — HOSPITAL ENCOUNTER (EMERGENCY)
Facility: HOSPITAL | Age: 27
Discharge: HOME | End: 2022-02-23
Attending: EMERGENCY MEDICINE | Admitting: EMERGENCY MEDICINE
Payer: OTHER GOVERNMENT

## 2022-02-23 VITALS
OXYGEN SATURATION: 98 % | RESPIRATION RATE: 18 BRPM | HEART RATE: 82 BPM | TEMPERATURE: 98 F | SYSTOLIC BLOOD PRESSURE: 130 MMHG | DIASTOLIC BLOOD PRESSURE: 85 MMHG

## 2022-02-23 DIAGNOSIS — T14.8XXA ABRASION: ICD-10-CM

## 2022-02-23 DIAGNOSIS — S62.609A FINGER FRACTURE: Primary | ICD-10-CM

## 2022-02-23 PROCEDURE — 99284 EMERGENCY DEPT VISIT MOD MDM: CPT | Performed by: EMERGENCY MEDICINE

## 2022-02-23 PROCEDURE — 99283 EMERGENCY DEPT VISIT LOW MDM: CPT

## 2022-02-23 PROCEDURE — 73140 X-RAY EXAM OF FINGER(S): CPT

## 2022-02-23 PROCEDURE — 12001 RPR S/N/AX/GEN/TRNK 2.5CM/<: CPT | Performed by: EMERGENCY MEDICINE

## 2022-02-23 RX ORDER — LIDOCAINE HYDROCHLORIDE AND EPINEPHRINE 10; 10 MG/ML; UG/ML
5 INJECTION, SOLUTION INFILTRATION; PERINEURAL ONCE
Status: COMPLETED | OUTPATIENT
Start: 2022-02-23 | End: 2022-02-23

## 2022-02-23 RX ORDER — CEPHALEXIN 500 MG/1
500 CAPSULE ORAL EVERY 6 HOURS SCHEDULED
Qty: 28 CAPSULE | Refills: 0 | Status: SHIPPED | OUTPATIENT
Start: 2022-02-23 | End: 2022-03-02

## 2022-02-23 RX ORDER — CEPHALEXIN 250 MG/1
500 CAPSULE ORAL ONCE
Status: COMPLETED | OUTPATIENT
Start: 2022-02-23 | End: 2022-02-23

## 2022-02-23 RX ORDER — SULFAMETHOXAZOLE AND TRIMETHOPRIM 800; 160 MG/1; MG/1
1 TABLET ORAL ONCE
Status: COMPLETED | OUTPATIENT
Start: 2022-02-23 | End: 2022-02-23

## 2022-02-23 RX ORDER — SULFAMETHOXAZOLE AND TRIMETHOPRIM 800; 160 MG/1; MG/1
1 TABLET ORAL 2 TIMES DAILY
Qty: 14 TABLET | Refills: 0 | Status: SHIPPED | OUTPATIENT
Start: 2022-02-23 | End: 2022-03-02

## 2022-02-23 RX ADMIN — LIDOCAINE HYDROCHLORIDE,EPINEPHRINE BITARTRATE 5 ML: 10; .01 INJECTION, SOLUTION INFILTRATION; PERINEURAL at 22:16

## 2022-02-23 RX ADMIN — CEPHALEXIN 500 MG: 250 CAPSULE ORAL at 22:44

## 2022-02-23 RX ADMIN — SULFAMETHOXAZOLE AND TRIMETHOPRIM 1 TABLET: 800; 160 TABLET ORAL at 22:44

## 2022-02-24 NOTE — ED PROVIDER NOTES
Final Diagnosis:  1  Finger fracture    2  Abrasion        Chief Complaint   Patient presents with    Finger Injury     playing basketball and R thumb bent backwards, fell and also has laceration to same  tetanus UTD  HPI  Patient presents for evaluation of right thumb pain  Patient states that he was playing basketball and then fell, attempting to brace himself on an outstretched hand  He then injured his thumb  He states that there was bleeding and pain of his distal thumb  Presents now for evaluation  Patient states he is up-to-date on vaccines  No other complaints  Unless otherwise specified:  - No language barrier    - History obtained from patient  - There are no limitations to the history obtained  - Previous charting was reviewed    PMH:   has a past medical history of ADHD  PSH:   has no past surgical history on file  ROS:  Review of Systems   -   - 13 point ROS was performed and all are normal unless stated in the history above  - Nursing note reviewed  Vitals reviewed  - Orders placed by myself and/or advanced practitioner / resident  PE:   Vitals:    02/23/22 2120 02/23/22 2200   BP: 138/87 130/85   BP Location: Left arm Left arm   Pulse: 79 82   Resp: 18 18   Temp: 98 °F (36 7 °C)    TempSrc: Tympanic    SpO2: 98% 98%     Vitals reviewed by me  Patient has generalized tenderness and swelling to right thumb  He has a small skin flap on the radial aspect of the distal right thumb which is hemostatic at this time  No damage to the nail  Overall motion is limited secondary to pain and swelling  Tender to palpation most significantly distal to the IP joint  He has no tenderness in his other digits  Full range of motion of wrist   No pain in elbow  Unless otherwise specified above:    General: VS reviewed  Appears in NAD    Head: Normocephalic, atraumatic  Eyes: EOM-I  No exudate  ENT: Atraumatic external nose and ears  No malocclusion  No stridor  No drooling  Neck: No JVD  CV: No pallor noted  Lungs:   No tachypnea  No respiratory distress    Abdomen:  Soft, non-tender, non-distended    MSK:   FROM spontaneously  No obvious deformity    Skin: Dry, intact  No obvious rash  Neuro: Awake, alert, GCS15, CN II-XII grossly intact  Speaking in full sentences  Motor grossly intact  Psychiatric/Behavioral: Appropriate mood and affect   Exam: deferred    Physical Exam     Laceration repair    Date/Time: 2/23/2022 10:45 PM  Performed by: Shayna Andrade MD  Authorized by: Shayna Andrade MD   Consent: Verbal consent obtained  Risks and benefits: risks, benefits and alternatives were discussed  Patient identity confirmed: verbally with patient  Body area: upper extremity  Location details: right thumb  Laceration length: 1 cm  Tendon involvement: none  Nerve involvement: none  Vascular damage: no  Anesthesia: digital block    Anesthesia:  Local Anesthetic: lidocaine 1% with epinephrine  Anesthetic total: 5 mL    Sedation:  Patient sedated: no      Wound Dehiscence:  Superficial Wound Dehiscence: simple closure      Procedure Details:  Irrigation solution: saline  Irrigation method: syringe  Amount of cleaning: standard  Debridement: none  Degree of undermining: none  Skin closure: 4-0 nylon  Number of sutures: 3  Technique: simple  Approximation: loose  Approximation difficulty: simple  Dressing: 4x4 sterile gauze and splint  Patient tolerance: patient tolerated the procedure well with no immediate complications    Splint application    Date/Time: 2/23/2022 10:45 PM  Performed by: Shayna Andrade MD  Authorized by: Shayna Andrade MD   Universal Protocol:  Consent: Verbal consent obtained    Consent given by: patient  Patient identity confirmed: verbally with patient      Pre-procedure details:     Sensation:  Normal  Procedure details:     Laterality:  Right    Location:  Finger    Finger:  R thumb    Strapping: no  Cast type:  Finger Supplies:  Cotton padding (Removable finger splint)  Post-procedure details:     Pain:  Unchanged    Sensation:  Normal    Patient tolerance of procedure: Tolerated well, no immediate complications       A:  - Nursing note reviewed  XR thumb first digit-min 2 views RIGHT   ED Interpretation   Fracture distal phalanx        Orders Placed This Encounter   Procedures    Laceration repair    Splint application    XR thumb first digit-min 2 views RIGHT     Labs Reviewed - No data to display      Final Diagnosis:  1  Finger fracture    2  Abrasion        P:  - patient appears to have fracture of distal phalanx of 1st digit on right hand  Patient also has a small abrasion versus laceration on the radial aspect of the thumb  Given that there is a large skin flap there will be repaired with sutures after extensive cleaning   -provided with prescriptions for antibiotics discussed return precautions as well as following up with Orthopedics  Medications   lidocaine-epinephrine (XYLOCAINE/EPINEPHRINE) 1 %-1:100,000 injection 5 mL (5 mL Infiltration Given 2/23/22 2216)   cephalexin (KEFLEX) capsule 500 mg (500 mg Oral Given 2/23/22 2244)   sulfamethoxazole-trimethoprim (BACTRIM DS) 800-160 mg per tablet 1 tablet (1 tablet Oral Given 2/23/22 2244)     Time reflects when diagnosis was documented in both MDM as applicable and the Disposition within this note     Time User Action Codes Description Comment    2/23/2022 10:39 PM Liliya Rausch Add [L29 582H] Finger fracture     2/23/2022 10:39 PM Jairo Rhodes Add Rory Cruz  8XXA] Abrasion       ED Disposition     ED Disposition Condition Date/Time Comment    Discharge Stable Wed Feb 23, 2022 10:38 PM Michael Blake discharge to home/self care              Follow-up Information     Follow up With Specialties Details Why Contact Info Additional [address] [facility] [address] W[address]ER [facility] Schedule an appointment as soon as possible for a visit  For fracture re-evaluation [address] [facility] [address]  [address] [facility] [address] [place] [address], [address]        Discharge Medication List as of 2/23/2022 10:40 PM      START taking these medications    Details   cephalexin (KEFLEX) 500 mg capsule Take 1 capsule (500 mg total) by mouth every 6 (six) hours for 7 days, Starting Wed 2/23/2022, Until Wed 3/2/2022, Print      sulfamethoxazole-trimethoprim (BACTRIM DS) 800-160 mg per tablet Take 1 tablet by mouth 2 (two) times a day for 7 days smx-tmp DS (BACTRIM) 800-160 mg tabs (1tab q12 D10), Starting Wed 2/23/2022, Until Wed 3/2/2022, Print         CONTINUE these medications which have NOT CHANGED    Details   amphetamine-dextroamphetamine (ADDERALL) 10 mg tablet Take 10 mg by mouth 2 (two) times a day, Historical Med      benzonatate (TESSALON PERLES) 100 mg capsule Take 1 capsule (100 mg total) by mouth 3 (three) times a day as needed for cough, Starting Fri 2/1/2019, Normal      neomycin-polymyxin-hydrocortisone (CORTISPORIN) 0 35%-10,000 units/mL-1% otic suspension Administer 4 drops into ears 3 (three) times a day, Starting Wed 2/26/2020, Normal      ondansetron (ZOFRAN) 4 mg tablet Take 1 tablet (4 mg total) by mouth every 6 (six) hours, Starting u 7/16/2020, Print           No discharge procedures on file  Prior to Admission Medications   Prescriptions Last Dose Informant Patient Reported? Taking?    amphetamine-dextroamphetamine (ADDERALL) 10 mg tablet   Yes No   Sig: Take 10 mg by mouth 2 (two) times a day   benzonatate (TESSALON PERLES) 100 mg capsule   No No   Sig: Take 1 capsule (100 mg total) by mouth 3 (three) times a day as needed for cough   Patient not taking: Reported on 7/4/2019   neomycin-polymyxin-hydrocortisone (CORTISPORIN) 0 35%-10,000 units/mL-1% otic suspension   No No   Sig: Administer 4 drops into ears 3 (three) times a day Patient not taking: Reported on 7/16/2020   ondansetron (ZOFRAN) 4 mg tablet   No No   Sig: Take 1 tablet (4 mg total) by mouth every 6 (six) hours      Facility-Administered Medications: None       Portions of the record may have been created with voice recognition software  Occasional wrong word or "sound a like" substitutions may have occurred due to the inherent limitations of voice recognition software  Read the chart carefully and recognize, using context, where substitutions have occurred      Electronically signed by:  MD Connor Barnhart MD  02/24/22 [id number]

## 2022-02-28 VITALS — WEIGHT: 187 LBS | HEIGHT: 74 IN | BODY MASS INDEX: 24 KG/M2

## 2022-02-28 DIAGNOSIS — S62.524A CLOSED NONDISPLACED FRACTURE OF DISTAL PHALANX OF RIGHT THUMB, INITIAL ENCOUNTER: Primary | ICD-10-CM

## 2022-02-28 PROCEDURE — 99203 OFFICE O/P NEW LOW 30 MIN: CPT | Performed by: ORTHOPAEDIC SURGERY

## 2022-02-28 NOTE — PROGRESS NOTES
ASSESSMENT/PLAN:    Diagnoses and all orders for this visit:    Closed nondisplaced fracture of distal phalanx of right thumb, initial encounter    Other orders  -     Suture removal      X-rays of the patient's right hand consistent with distal phalanx fracture of his right thumb  The fracture is in acceptable alignment  He should continue the Stax splint  The sutures were removed today without issue  He will follow up with our office in 1 month with new x-rays of his thumb three views  The patient is acceptable to this plan  Return in about 1 month (around 3/28/2022)  The patient has a nondisplaced fracture of the distal phalanx of his right thumb  The sutures removed today  He will continue the Stax splint  Return back in 1 month for re-evaluation with new x-rays of right thumb-three views  If there is any problems sooner, he will not hesitate to let us know      _____________________________________________________  CHIEF COMPLAINT:  Chief Complaint   Patient presents with    Right Thumb - Pain         SUBJECTIVE:  Tomasa Marquez is a 27 y o  male who presents to our office complaining of right thumb pain  He is currently prisoner  He was taken to the emergency department on 02/23/2022 after he injured his thumb playing basketball  He states ball bounced up and hit the tip of his finger  He had immediate pain and was bleeding  X-rays were performed which were consistent with a fracture  His wound was sutured  He denies any fever or chills  The following portions of the patient's history were reviewed and updated as appropriate: allergies, current medications, past family history, past medical history, past social history, past surgical history and problem list     PAST MEDICAL HISTORY:  Past Medical History:   Diagnosis Date    ADHD        PAST SURGICAL HISTORY:  History reviewed  No pertinent surgical history  FAMILY HISTORY:  History reviewed  No pertinent family history      SOCIAL HISTORY:  Social History     Tobacco Use    Smoking status: Never Smoker    Smokeless tobacco: Never Used   Vaping Use    Vaping Use: Never used   Substance Use Topics    Alcohol use: Never    Drug use: Never       MEDICATIONS:    Current Outpatient Medications:     amphetamine-dextroamphetamine (ADDERALL) 10 mg tablet, Take 10 mg by mouth 2 (two) times a day, Disp: , Rfl:     cephalexin (KEFLEX) 500 mg capsule, Take 1 capsule (500 mg total) by mouth every 6 (six) hours for 7 days, Disp: 28 capsule, Rfl: 0    ondansetron (ZOFRAN) 4 mg tablet, Take 1 tablet (4 mg total) by mouth every 6 (six) hours, Disp: 12 tablet, Rfl: 0    sulfamethoxazole-trimethoprim (BACTRIM DS) 800-160 mg per tablet, Take 1 tablet by mouth 2 (two) times a day for 7 days smx-tmp DS (BACTRIM) 800-160 mg tabs (1tab q12 D10), Disp: 14 tablet, Rfl: 0    benzonatate (TESSALON PERLES) 100 mg capsule, Take 1 capsule (100 mg total) by mouth 3 (three) times a day as needed for cough (Patient not taking: Reported on 7/4/2019), Disp: 30 capsule, Rfl: 0    neomycin-polymyxin-hydrocortisone (CORTISPORIN) 0 35%-10,000 units/mL-1% otic suspension, Administer 4 drops into ears 3 (three) times a day (Patient not taking: Reported on 7/16/2020), Disp: 5 mL, Rfl: 0    ALLERGIES:  No Known Allergies    ROS:  Review of Systems     Constitutional: Negative for fatigue, fever or loss of appetite  HENT: Negative  Respiratory: Negative for shortness of breath, dyspnea  Cardiovascular: Negative for chest pain/tightness  Gastrointestinal: Negative for abdominal pain, N/V  Endocrine: Negative for cold/heat intolerance, unexplained weight loss/gain  Genitourinary: Negative for flank pain, dysuria, hematuria  Musculoskeletal: Positive for arthralgia   Skin: Negative for rash  Neurological: Negative for numbness or tingling  Psychiatric/Behavioral: Negative for agitation    _____________________________________________________  PHYSICAL EXAMINATION:    Height 6' 2" (1 88 m), weight 84 8 kg (187 lb)  Constitutional: Oriented to person, place, and time  Appears well-developed and well-nourished  No distress  HENT:   Head: Normocephalic  Eyes: Conjunctivae are normal  Right eye exhibits no discharge  Left eye exhibits no discharge  No scleral icterus  Cardiovascular: Normal rate  Pulmonary/Chest: Effort normal    Neurological: Alert and oriented to person, place, and time  Skin: Skin is warm and dry  No rash noted  Not diaphoretic  No erythema  No pallor  Psychiatric: Normal mood and affect  Behavior is normal  Judgment and thought content normal       MUSCULOSKELETAL EXAMINATION:   Physical Exam  Ortho Exam    Right upper extremity is neurovascularly intact  Fingers are pink mobile  Compartments are soft  Sutures are in place  Wound is clean and intact  Slight decrease in sensation  Range of motion limited to pain  Brisk cap refill  Sensation intact  Objective:  BP Readings from Last 1 Encounters:   02/23/22 130/85      Wt Readings from Last 1 Encounters:   02/28/22 84 8 kg (187 lb)        BMI:   Estimated body mass index is 24 01 kg/m² as calculated from the following:    Height as of this encounter: 6' 2" (1 88 m)  Weight as of this encounter: 84 8 kg (187 lb)  Radiographs:  _____________________________________________________  STUDIES REVIEWED:  I have personally reviewed pertinent films and reports in PACS  X-rays of the patient's right hand consistent with distal phalanx fracture of his right thumb    The fracture is in acceptable alignment    PROCEDURES PERFORMED:  Suture removal    Date/Time: 2/28/2022 8:13 AM  Performed by: Anne Adams PA-C  Authorized by: Anne Adams PA-C   Universal Protocol:  Risks and benefits: risks, benefits and alternatives were discussed  Consent given by: patient  Patient understanding: patient states understanding of the procedure being performed  Site marked: the operative site was marked      Patient location:  Clinic  Location:     Laterality:  Right    Location:  Upper extremity    Upper extremity location:  Hand    Hand location:  R thumb  Procedure details: Tools used:  Scissors and tweezers    Wound appearance:  Nonpurulent    Number of sutures removed:  4  Post-procedure details:     Post-removal:  Antibiotic ointment applied and dressing applied    Patient tolerance of procedure:   Tolerated well, no immediate complications          Jesika Marroquin PA-C

## 2022-03-28 VITALS — WEIGHT: 187 LBS | BODY MASS INDEX: 24 KG/M2 | HEIGHT: 74 IN

## 2022-03-28 DIAGNOSIS — S62.524D CLOSED NONDISPLACED FRACTURE OF DISTAL PHALANX OF RIGHT THUMB WITH ROUTINE HEALING, SUBSEQUENT ENCOUNTER: Primary | ICD-10-CM

## 2022-03-28 PROCEDURE — 99213 OFFICE O/P EST LOW 20 MIN: CPT | Performed by: ORTHOPAEDIC SURGERY

## 2022-03-28 NOTE — PROGRESS NOTES
Assessment:  1  Closed nondisplaced fracture of distal phalanx of right thumb with routine healing, subsequent encounter  XR thumb right first digit-min 2v       Plan:  Distal right thumb fracture sustained 2/23/22  Radiograph displayed healed fracture site  He can return to activity with no restrictions  The patient can follow up as needed and is welcome to return at any point with any new or old issue  To do next visit:  Return if symptoms worsen or fail to improve  The above stated was discussed in layman's terms and the patient expressed understanding  All questions were answered to the patient's satisfaction  The patient's fracture is now healed  He has full strength full motion  No tendon and ligament dysfunction  No nail problems  Would recommend continuation of a home exercise program   Follow up on an as-needed basis  If his condition changes, he will not hesitate to let us know      Scribe Attestation    I,:  Sharlene Guerrier am acting as a scribe while in the presence of the attending physician :       I,:  Leann Rosario, DO personally performed the services described in this documentation    as scribed in my presence :             Subjective:   Brunilda Pineda is a 27 y o  male who presents for distal right thumb fracture sustained 2/23/22 while playing basketball  The patient is currently incarcerated as a prisoner  Today he offers no complaints of pain and desires to go back to play basketball      Review of systems negative unless otherwise specified in HPI    Past Medical History:   Diagnosis Date    ADHD        History reviewed  No pertinent surgical history  History reviewed  No pertinent family history      Social History     Occupational History    Not on file   Tobacco Use    Smoking status: Never Smoker    Smokeless tobacco: Never Used   Vaping Use    Vaping Use: Never used   Substance and Sexual Activity    Alcohol use: Never    Drug use: Never    Sexual activity: Not on file         Current Outpatient Medications:     amphetamine-dextroamphetamine (ADDERALL) 10 mg tablet, Take 10 mg by mouth 2 (two) times a day, Disp: , Rfl:     ondansetron (ZOFRAN) 4 mg tablet, Take 1 tablet (4 mg total) by mouth every 6 (six) hours, Disp: 12 tablet, Rfl: 0    benzonatate (TESSALON PERLES) 100 mg capsule, Take 1 capsule (100 mg total) by mouth 3 (three) times a day as needed for cough (Patient not taking: Reported on 7/4/2019), Disp: 30 capsule, Rfl: 0    neomycin-polymyxin-hydrocortisone (CORTISPORIN) 0 35%-10,000 units/mL-1% otic suspension, Administer 4 drops into ears 3 (three) times a day (Patient not taking: Reported on 7/16/2020), Disp: 5 mL, Rfl: 0    No Known Allergies         Vitals:       Objective:  Physical exam  · General: Awake, Alert, Oriented  · Eyes: Pupils equal, round and reactive to light  · Heart: regular rate and rhythm  · Lungs: No audible wheezing  · Abdomen: soft                    Ortho Exam  Right thumb:  Mild erythema over distal phalanx   Very mild restriction with flexion  Patient can make fist with no pain  Sensation intact     Diagnostics, reviewed and taken today if performed as documented: The attending physician has personally reviewed the pertinent films in PACS and interpretation is as follows:  Right thumb x-ray:  Healed fracture site of distal phalanx  Procedures, if performed today:    Procedures    None performed      Portions of the record may have been created with voice recognition software  Occasional wrong word or "sound a like" substitutions may have occurred due to the inherent limitations of voice recognition software  Read the chart carefully and recognize, using context, where substitutions have occurred

## 2022-03-28 NOTE — LETTER
March 28, 2022     Patient: Kristopher Cuello   YOB: 1995   Date of Visit: 3/28/2022       To Whom it May Concern:    Roque Foster is under my professional care  He was seen in my office on 3/28/2022  The patient is cleared for all activity  If you have any questions or concerns, please don't hesitate to call           Sincerely,          Edgar Cortes DO        CC: No Recipients